# Patient Record
Sex: MALE | Race: WHITE | NOT HISPANIC OR LATINO | Employment: STUDENT | URBAN - METROPOLITAN AREA
[De-identification: names, ages, dates, MRNs, and addresses within clinical notes are randomized per-mention and may not be internally consistent; named-entity substitution may affect disease eponyms.]

---

## 2023-09-10 PROBLEM — Q90.2 TRANSLOCATION DOWN SYNDROME (HHS-HCC): Status: ACTIVE | Noted: 2023-09-10

## 2023-09-10 PROBLEM — E07.9 THYROID DISORDER: Status: ACTIVE | Noted: 2023-09-10

## 2023-09-10 PROBLEM — L30.9 ECZEMA: Status: ACTIVE | Noted: 2023-09-10

## 2023-09-10 PROBLEM — N18.2 STAGE 2 CHRONIC KIDNEY DISEASE: Status: ACTIVE | Noted: 2023-09-10

## 2023-09-10 PROBLEM — E06.3 HASHIMOTO'S THYROIDITIS: Status: ACTIVE | Noted: 2023-09-10

## 2023-09-10 PROBLEM — N13.30 BILATERAL HYDRONEPHROSIS: Status: ACTIVE | Noted: 2023-09-10

## 2023-09-10 PROBLEM — K90.0 CELIAC DISEASE IN PEDIATRIC PATIENT (HHS-HCC): Status: ACTIVE | Noted: 2023-09-10

## 2023-09-10 RX ORDER — LEVOTHYROXINE SODIUM 112 UG/1
0.5 TABLET ORAL DAILY
COMMUNITY
Start: 2018-03-05 | End: 2024-03-25 | Stop reason: SDUPTHER

## 2023-10-12 NOTE — PROGRESS NOTES
I had the pleasure of seeing Trevor Rivera, 23 y.o., male in the [facility] at [facility] for follow up of CKD II based on trending creatinines. His first creatinine was noted to be elevated in January 2021. Repeat creatinine in January 2022 and in April 2022, again, demonstrated an elevated serum creatinine in the range of CKD II. Renal ultrasound in January 2022 did show mild bilateral hydronephrosis and smaller sized kidneys.      Trevor was last seen in October 2022.   Since that time, he has been doing well. Trevor is nonverbal but has been putting his hand over his stomach. Dad is unsure if he has some stomach pain, is hungry or full. He may have some issues with constipation. Dad saw blood in stool one time, but never again. Trevor has been placing his hand over his stomach for probably the last year or so. Dad reports that he stools usually once a day, sometimes every two days, but he is at school most of the day, so he is unsure exactly. Dad denies any recent illnesses, fevers, concerns for UTIs, gross hematuria. No swelling in his feet or ankles.     Birth History:     Born full term, no NICU stay, mom reports that he had some heart testing after he was born, which was normal     Review of Systems   Gastrointestinal:  Positive for abdominal pain.   All other systems reviewed and are negative.    Current Outpatient Medications   Medication Instructions    levothyroxine (Synthroid, Levoxyl) 112 mcg tablet 0.5 tablets, oral, Daily      Patient Active Problem List:  Patient Active Problem List    Diagnosis Date Noted    Bilateral hydronephrosis 09/10/2023    Celiac disease in pediatric patient 09/10/2023    Eczema 09/10/2023    Hashimoto's thyroiditis 09/10/2023    Stage 2 chronic kidney disease 09/10/2023    Thyroid disorder 09/10/2023    Translocation Down syndrome 09/10/2023       Past Medical History:     Past Medical History:   Diagnosis Date    Acute upper  respiratory infection, unspecified 07/17/2017    Acute upper respiratory infection    Enteroviral vesicular stomatitis with exanthem 12/13/2017    Hand, foot and mouth disease    Hypothyroidism, unspecified 06/24/2013    Hypothyroidism    Otitis media, unspecified, right ear 07/17/2017    Acute right otitis media    Personal history of other diseases of the nervous system and sense organs 04/02/2015    History of acute otitis media    Personal history of pneumonia (recurrent) 07/17/2017    History of pneumonia    Rash and other nonspecific skin eruption 04/03/2015    Rash       Past Surgical History:     No past surgical history on file.    Family History:     Family History   Problem Relation Name Age of Onset    Diabetes Paternal Grandmother      Down syndrome Other      Celiac disease Other       Social History:     Trevor is in school. He likes to vacuum and wash/organize the dishes in the kitchen     Visit Vitals  /74   Pulse 88   Temp 36.7 °C (98 °F)     Facility age limit for growth %hayley is 20 years.     Physical Exam  Vitals reviewed.   Constitutional:       Appearance: Normal appearance.   HENT:      Head: Normocephalic and atraumatic.      Nose: Nose normal.      Mouth/Throat:      Mouth: Mucous membranes are moist.   Cardiovascular:      Rate and Rhythm: Normal rate and regular rhythm.      Pulses: Normal pulses.      Heart sounds: Normal heart sounds. No murmur heard.  Pulmonary:      Effort: Pulmonary effort is normal.      Breath sounds: Normal breath sounds.   Abdominal:      Palpations: Abdomen is soft.      Tenderness: There is no right CVA tenderness or left CVA tenderness.   Musculoskeletal:         General: No swelling. Normal range of motion.      Cervical back: Normal range of motion and neck supple.   Skin:     General: Skin is warm and dry.   Neurological:      General: No focal deficit present.      Mental Status: He is alert. Mental status is at baseline.   Psychiatric:         Mood  and Affect: Mood normal.         Behavior: Behavior normal.        Labs:             Component  Ref Range & Units 12 mo ago  (10/14/22) 1 yr ago  (4/8/22) 1 yr ago  (1/18/22) 1 yr ago  (12/9/21) 2 yr ago  (1/5/21)   Glucose  74 - 99 mg/dL 83 96 84 89 101 High    Sodium  136 - 145 mmol/L 138 140 139 138 139   Potassium  3.5 - 5.3 mmol/L 3.8 4.5 4.2 4.4 4.2   Chloride  98 - 107 mmol/L 100 101 102 101 102   Bicarbonate  21 - 32 mmol/L 31 31 30 29 26   Anion Gap  10 - 20 mmol/L 11 13 11 12 15   Urea Nitrogen  6 - 23 mg/dL 18 22 15 20 19   Creatinine  0.50 - 1.30 mg/dL 1.21 1.22 1.23 1.57 High  1.33 High    GFR MALE  >90 mL/min/1.73m2 88 87 CM 87 CM         Radiology:  Narrative & Impression   MRN: [id number]  Patient Name: ERIN PARK     STUDY:  US RENAL BILAT;  1/18/2022 5:57 pm     INDICATION:  Elevated creatinine, concern for urinary holding, renal dysplasia  R79.89: Elevated serum creatinine.     COMPARISON:  None.    ORDERING CLINICIAN:  ERASTO OKEEFE     TECHNIQUE:  Real-time sonographic evaluation of the kidneys and urinary bladder  was performed.     FINDINGS:  RIGHT KIDNEY:  Right kidney measures  9.8 cm.  Mild right hydronephrosis is present.  No shadowing calculus is visualized. No discrete renal lesion is  visualized.     LEFT KIDNEY:  Left kidney measures  9.6 cm.  Mild left hydronephrosis is present.  No shadowing calculus identified. No discrete renal lesion is  visualized.     BLADDER:  Distended bladder has a calculated volume of  627 mL.     No intraluminal mass, wall thickening or shadowing calculus is  visualized. Both ureteral jets can be demonstrated with color Doppler  imaging     Small volume postvoid residual of 9 mL is seen.     IMPRESSION:  Bilateral mild hydronephrosis. No shadowing calculi demonstrated.     No focal urinary bladder abnormality identified.  Small volume  postvoid residual.       Assessment:  In summary, Erin is a 23 y.o. male with Trisomy 21 and Hashimoto's thyroiditis  who presents today for a follow up of CKD II most likely due to the mild hydronephrosis and renal dysplasia, which may have been caused by his urinary holding. Records from November 2010 demonstrate a creatinine of 0.7mg/dL giving him an eGFR of 65mL/min/1.73m2 using the CKiD U25 formula when he was about 10 years old. His last creatinine checked in October 2022 was 1.21mg/dL giving him an eGFR of 65mL/min/1.73m2 using the CKiD U25 w/cystatin c formula (stable CKD II). For his age, his stature is at the 1st% but when he is plotted on the Down syndrome growth chart, his height puts him at the ~90th% for his age. Based on his past blood work, he most likely has had CKD since age 10. His urine today is negative for blood and protein. It is reassuring that Trevor remains normotensive, and without hematuria or proteinuria.     Recommendations:    1. Ordered full CKD labs, CBC, RFP, cystatin c, Ferritin, Iron/TIBC, PTH, uric acid, vitamin D  2. Follow up with me in 1 year, if CKD labs are concerning, may see back in 6 months   3. Discussed with father, if abdominal pain continues or gets worse, see PCP/pediatrician. Consider abdominal xray for constipation versus abdominal ultrasound to assess gallbladder and kidneys     RAYMOND Aden, CNP  Pediatric Nephrology and Hypertension   [id number] [place] [address]  (P) [phone]  (F) [phone]

## 2023-10-13 ENCOUNTER — LAB (OUTPATIENT)
Dept: LAB | Facility: LAB | Age: 23
End: 2023-10-13
Payer: COMMERCIAL

## 2023-10-13 ENCOUNTER — OFFICE VISIT (OUTPATIENT)
Dept: PEDIATRIC NEPHROLOGY | Facility: CLINIC | Age: 23
End: 2023-10-13
Payer: COMMERCIAL

## 2023-10-13 VITALS
TEMPERATURE: 98 F | HEART RATE: 88 BPM | BODY MASS INDEX: 21.88 KG/M2 | WEIGHT: 123.46 LBS | SYSTOLIC BLOOD PRESSURE: 115 MMHG | HEIGHT: 63 IN | DIASTOLIC BLOOD PRESSURE: 74 MMHG

## 2023-10-13 DIAGNOSIS — N13.30 BILATERAL HYDRONEPHROSIS: ICD-10-CM

## 2023-10-13 DIAGNOSIS — N18.2 STAGE 2 CHRONIC KIDNEY DISEASE: Primary | ICD-10-CM

## 2023-10-13 DIAGNOSIS — N18.2 STAGE 2 CHRONIC KIDNEY DISEASE: ICD-10-CM

## 2023-10-13 LAB
25(OH)D3 SERPL-MCNC: 29 NG/ML (ref 30–100)
ALBUMIN SERPL BCP-MCNC: 4.4 G/DL (ref 3.4–5)
ANION GAP SERPL CALC-SCNC: 10 MMOL/L (ref 10–20)
BASOPHILS # BLD AUTO: 0.1 X10*3/UL (ref 0–0.1)
BASOPHILS NFR BLD AUTO: 1.2 %
BUN SERPL-MCNC: 13 MG/DL (ref 6–23)
CALCIUM SERPL-MCNC: 9.2 MG/DL (ref 8.6–10.3)
CHLORIDE SERPL-SCNC: 101 MMOL/L (ref 98–107)
CO2 SERPL-SCNC: 32 MMOL/L (ref 21–32)
CREAT SERPL-MCNC: 1.36 MG/DL (ref 0.5–1.3)
EOSINOPHIL # BLD AUTO: 0.06 X10*3/UL (ref 0–0.7)
EOSINOPHIL NFR BLD AUTO: 0.7 %
ERYTHROCYTE [DISTWIDTH] IN BLOOD BY AUTOMATED COUNT: 12.6 % (ref 11.5–14.5)
FERRITIN SERPL-MCNC: 164 NG/ML (ref 20–300)
GFR SERPL CREATININE-BSD FRML MDRD: 76 ML/MIN/1.73M*2
GLUCOSE SERPL-MCNC: 105 MG/DL (ref 74–99)
HCT VFR BLD AUTO: 47.3 % (ref 41–52)
HGB BLD-MCNC: 17 G/DL (ref 13.5–17.5)
IMM GRANULOCYTES # BLD AUTO: 0.01 X10*3/UL (ref 0–0.7)
IMM GRANULOCYTES NFR BLD AUTO: 0.1 % (ref 0–0.9)
IRON SATN MFR SERPL: 23 % (ref 25–45)
IRON SERPL-MCNC: 74 UG/DL (ref 35–150)
LYMPHOCYTES # BLD AUTO: 2.58 X10*3/UL (ref 1.2–4.8)
LYMPHOCYTES NFR BLD AUTO: 31 %
MCH RBC QN AUTO: 34.1 PG (ref 26–34)
MCHC RBC AUTO-ENTMCNC: 35.9 G/DL (ref 32–36)
MCV RBC AUTO: 95 FL (ref 80–100)
MONOCYTES # BLD AUTO: 0.43 X10*3/UL (ref 0.1–1)
MONOCYTES NFR BLD AUTO: 5.2 %
NEUTROPHILS # BLD AUTO: 5.14 X10*3/UL (ref 1.2–7.7)
NEUTROPHILS NFR BLD AUTO: 61.8 %
NRBC BLD-RTO: 0 /100 WBCS (ref 0–0)
PHOSPHATE SERPL-MCNC: 4.3 MG/DL (ref 2.5–4.9)
PLATELET # BLD AUTO: 236 X10*3/UL (ref 150–450)
PMV BLD AUTO: 10.1 FL (ref 7.5–11.5)
POC APPEARANCE, URINE: CLEAR
POC BILIRUBIN, URINE: NEGATIVE
POC BLOOD, URINE: NEGATIVE
POC COLOR, URINE: YELLOW
POC GLUCOSE, URINE: NEGATIVE MG/DL
POC KETONES, URINE: NEGATIVE MG/DL
POC LEUKOCYTES, URINE: NEGATIVE
POC NITRITE,URINE: NEGATIVE
POC PH, URINE: 7 PH
POC PROTEIN, URINE: NEGATIVE MG/DL
POC SPECIFIC GRAVITY, URINE: <=1.005
POC UROBILINOGEN, URINE: 0.2 EU/DL
POTASSIUM SERPL-SCNC: 4.1 MMOL/L (ref 3.5–5.3)
PTH-INTACT SERPL-MCNC: 27.3 PG/ML (ref 18.5–88)
RBC # BLD AUTO: 4.99 X10*6/UL (ref 4.5–5.9)
SODIUM SERPL-SCNC: 139 MMOL/L (ref 136–145)
TIBC SERPL-MCNC: 315 UG/DL (ref 240–445)
UIBC SERPL-MCNC: 241 UG/DL (ref 110–370)
URATE SERPL-MCNC: 6.6 MG/DL (ref 4–7.5)
WBC # BLD AUTO: 8.3 X10*3/UL (ref 4.4–11.3)

## 2023-10-13 PROCEDURE — 81002 URINALYSIS NONAUTO W/O SCOPE: CPT | Performed by: NURSE PRACTITIONER

## 2023-10-13 PROCEDURE — 85025 COMPLETE CBC W/AUTO DIFF WBC: CPT

## 2023-10-13 PROCEDURE — 83550 IRON BINDING TEST: CPT

## 2023-10-13 PROCEDURE — 80069 RENAL FUNCTION PANEL: CPT

## 2023-10-13 PROCEDURE — 82610 CYSTATIN C: CPT

## 2023-10-13 PROCEDURE — 99214 OFFICE O/P EST MOD 30 MIN: CPT | Performed by: NURSE PRACTITIONER

## 2023-10-13 PROCEDURE — 82728 ASSAY OF FERRITIN: CPT

## 2023-10-13 PROCEDURE — 36415 COLL VENOUS BLD VENIPUNCTURE: CPT

## 2023-10-13 PROCEDURE — 83970 ASSAY OF PARATHORMONE: CPT

## 2023-10-13 PROCEDURE — 84550 ASSAY OF BLOOD/URIC ACID: CPT

## 2023-10-13 PROCEDURE — 83540 ASSAY OF IRON: CPT

## 2023-10-13 PROCEDURE — 82306 VITAMIN D 25 HYDROXY: CPT

## 2023-10-13 ASSESSMENT — ENCOUNTER SYMPTOMS: ABDOMINAL PAIN: 1

## 2023-10-13 NOTE — PATIENT INSTRUCTIONS
Trevor Rivera, thank you for seeing me today. Please feel free to call my office with any questions or concerns.   Here is our plan:    Please gt blood work to check kidney function. I will call you with the results next week.   Plan to follow up in 1 year unless blood work looks concerning, then I may see him back sooner  Blood pressure and urine are normal today     RAYMOND Aden, CNP  Pediatric Nephrology and Hypertension   [address] [address] [place] [address]  (P) [phone]  (F) [phone]

## 2023-10-15 LAB
CYSTATIN C SERPL-MCNC: 1.3 MG/L (ref 0.5–1.2)
GFR/BSA.PRED SERPLBLD CYS-BASED-ARV: 64 ML/MIN/BSA

## 2023-10-16 DIAGNOSIS — E55.9 HYPOVITAMINOSIS D: Primary | ICD-10-CM

## 2023-10-16 RX ORDER — CHOLECALCIFEROL (VITAMIN D3) 25 MCG
25 TABLET ORAL DAILY
Qty: 30 TABLET | Refills: 11 | Status: SHIPPED | OUTPATIENT
Start: 2023-10-16 | End: 2023-11-08

## 2023-11-08 DIAGNOSIS — E55.9 HYPOVITAMINOSIS D: ICD-10-CM

## 2023-11-08 RX ORDER — CHOLECALCIFEROL (VITAMIN D3) 25 MCG
TABLET ORAL DAILY
Qty: 90 TABLET | Refills: 4 | Status: SHIPPED | OUTPATIENT
Start: 2023-11-08

## 2024-03-25 DIAGNOSIS — E06.3 HASHIMOTO'S THYROIDITIS: ICD-10-CM

## 2024-03-25 RX ORDER — LEVOTHYROXINE SODIUM 112 UG/1
56 TABLET ORAL DAILY
Qty: 15 TABLET | Refills: 0 | Status: SHIPPED | OUTPATIENT
Start: 2024-03-25 | End: 2024-04-29 | Stop reason: SDUPTHER

## 2024-04-29 DIAGNOSIS — E06.3 HASHIMOTO'S THYROIDITIS: ICD-10-CM

## 2024-04-29 RX ORDER — LEVOTHYROXINE SODIUM 112 UG/1
56 TABLET ORAL DAILY
Qty: 15 TABLET | Refills: 2 | Status: SHIPPED | OUTPATIENT
Start: 2024-04-29

## 2024-07-25 ENCOUNTER — APPOINTMENT (OUTPATIENT)
Dept: PEDIATRIC ENDOCRINOLOGY | Facility: CLINIC | Age: 24
End: 2024-07-25
Payer: COMMERCIAL

## 2024-07-25 ENCOUNTER — LAB (OUTPATIENT)
Dept: LAB | Facility: LAB | Age: 24
End: 2024-07-25
Payer: COMMERCIAL

## 2024-07-25 VITALS
DIASTOLIC BLOOD PRESSURE: 75 MMHG | HEIGHT: 63 IN | BODY MASS INDEX: 21.8 KG/M2 | WEIGHT: 123.02 LBS | HEART RATE: 69 BPM | SYSTOLIC BLOOD PRESSURE: 126 MMHG | TEMPERATURE: 98.3 F

## 2024-07-25 DIAGNOSIS — E06.3 HASHIMOTO'S THYROIDITIS: ICD-10-CM

## 2024-07-25 DIAGNOSIS — Q90.2 TRANSLOCATION DOWN SYNDROME (HHS-HCC): Primary | ICD-10-CM

## 2024-07-25 DIAGNOSIS — E07.9 THYROID DISORDER: ICD-10-CM

## 2024-07-25 DIAGNOSIS — Q90.2 TRANSLOCATION DOWN SYNDROME (HHS-HCC): ICD-10-CM

## 2024-07-25 LAB
T4 FREE SERPL-MCNC: 0.86 NG/DL (ref 0.61–1.12)
TSH SERPL-ACNC: 1.11 MIU/L (ref 0.44–3.98)
TTG IGA SER IA-ACNC: <1 U/ML

## 2024-07-25 PROCEDURE — 3008F BODY MASS INDEX DOCD: CPT | Performed by: PEDIATRICS

## 2024-07-25 PROCEDURE — 36415 COLL VENOUS BLD VENIPUNCTURE: CPT

## 2024-07-25 PROCEDURE — 84443 ASSAY THYROID STIM HORMONE: CPT

## 2024-07-25 PROCEDURE — 84439 ASSAY OF FREE THYROXINE: CPT

## 2024-07-25 PROCEDURE — 99214 OFFICE O/P EST MOD 30 MIN: CPT | Performed by: PEDIATRICS

## 2024-07-25 PROCEDURE — 83516 IMMUNOASSAY NONANTIBODY: CPT

## 2024-07-25 RX ORDER — LEVOTHYROXINE SODIUM 112 UG/1
56 TABLET ORAL DAILY
Qty: 15 TABLET | Refills: 11 | Status: SHIPPED | OUTPATIENT
Start: 2024-07-25

## 2024-07-25 NOTE — PROGRESS NOTES
"Subjective   Trevor Rivera is a 23 y.o. 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 Patient was diagnosed 20 years ago.  Medications : Levothyroxine 56 mcg (1/2 of a 112 mcg tab)  Adherence : never misses a dose  Patient takes Medication : 30+ minutes before breakfast  Hyperthyroid Symptoms : none  Hypothyroid Symptoms : none    Finished school at [employer], will be with [employer] [facility].     Starts next week.       Current Outpatient Medications:   ·  cholecalciferol (Vitamin D-3) 25 MCG (1000 UT) tablet, TAKE 1 TABLET (25 MCG) BY MOUTH DAILY, Disp: 90 tablet, Rfl: 4  ·  levothyroxine (Synthroid, Levoxyl) 112 mcg tablet, Take 0.5 tablets (56 mcg) by mouth once daily., Disp: 15 tablet, Rfl: 2    Has a gluten free diet and doing well with this.       Review of Systems   Constitutional: Negative.    HENT: Negative.     Eyes: Negative.    Respiratory: Negative.     Cardiovascular: Negative.    Gastrointestinal: Negative.    Musculoskeletal: Negative.    Neurological: Negative.    Hematological: Negative.    Psychiatric/Behavioral: Negative.         Objective   /75   Pulse 69   Temp 36.8 °C (98.3 °F)   Ht 1.59 m (5' 2.6\")   Wt 55.8 kg (123 lb 0.3 oz)   BMI 22.07 kg/m²    Growth Velocity: Facility age limit for growth %hayley is 20 years.    Physical Exam  Constitutional:       Appearance: Normal appearance.   HENT:      Head: Normocephalic and atraumatic.      Nose: Nose normal.      Mouth/Throat:      Mouth: Mucous membranes are moist.   Eyes:      Extraocular Movements: Extraocular movements intact.      Conjunctiva/sclera: Conjunctivae normal.   Neck:      Comments: No nodules or lymphadenopathy identified.  Cardiovascular:      Rate and Rhythm: Normal rate and regular rhythm.      Heart sounds: Normal heart sounds.   Pulmonary:      Effort: Pulmonary effort is normal.      Breath sounds: Normal breath sounds.   Abdominal:      General: Bowel " sounds are normal.      Palpations: Abdomen is soft.   Musculoskeletal:         General: Normal range of motion.      Cervical back: Normal range of motion and neck supple.   Skin:     General: Skin is warm and dry.   Neurological:      General: No focal deficit present.      Mental Status: He is alert and oriented to person, place, and time.   Psychiatric:         Mood and Affect: Mood normal.         Behavior: Behavior normal.          Assessment/Plan   Problem List Items Addressed This Visit             ICD-10-CM    Hashimoto's thyroiditis E06.3    Relevant Medications    levothyroxine (Synthroid, Levoxyl) 112 mcg tablet    Other Relevant Orders    Tissue Transglutaminase IgA (Completed)    Thyroid Stimulating Hormone (Completed)    Thyroxine, Free (Completed)    Thyroid disorder E07.9    Relevant Orders    Tissue Transglutaminase IgA (Completed)    Thyroid Stimulating Hormone (Completed)    Thyroxine, Free (Completed)    Translocation Down syndrome ([facility]) - Primary Q90.2    Relevant Orders    Tissue Transglutaminase IgA (Completed)    Thyroid Stimulating Hormone (Completed)    Thyroxine, Free (Completed)     Trisomy 21 with autoimmune hypothyroidism and celiac disease. Clinically euthyroid on 56 mcg levothyroxine daily. Check labs today including TTG antibodies. Refill medication and follow up annually.

## 2024-07-31 ASSESSMENT — ENCOUNTER SYMPTOMS
HEMATOLOGIC/LYMPHATIC NEGATIVE: 1
CARDIOVASCULAR NEGATIVE: 1
EYES NEGATIVE: 1
GASTROINTESTINAL NEGATIVE: 1
CONSTITUTIONAL NEGATIVE: 1
RESPIRATORY NEGATIVE: 1
NEUROLOGICAL NEGATIVE: 1
PSYCHIATRIC NEGATIVE: 1
MUSCULOSKELETAL NEGATIVE: 1

## 2024-10-11 ENCOUNTER — APPOINTMENT (OUTPATIENT)
Dept: PEDIATRIC NEPHROLOGY | Facility: CLINIC | Age: 24
End: 2024-10-11
Payer: COMMERCIAL

## 2024-10-11 VITALS
WEIGHT: 118.61 LBS | DIASTOLIC BLOOD PRESSURE: 81 MMHG | TEMPERATURE: 97.8 F | BODY MASS INDEX: 21.02 KG/M2 | HEIGHT: 63 IN | HEART RATE: 76 BPM | SYSTOLIC BLOOD PRESSURE: 128 MMHG | OXYGEN SATURATION: 95 %

## 2024-10-11 DIAGNOSIS — N18.2 STAGE 2 CHRONIC KIDNEY DISEASE: Primary | ICD-10-CM

## 2024-10-11 DIAGNOSIS — Q90.2 TRANSLOCATION DOWN SYNDROME (HHS-HCC): ICD-10-CM

## 2024-10-11 DIAGNOSIS — N13.30 BILATERAL HYDRONEPHROSIS: ICD-10-CM

## 2024-10-11 LAB
CREAT UR-MCNC: 46.6 MG/DL (ref 20–370)
MICROALBUMIN UR-MCNC: <7 MG/L
MICROALBUMIN/CREAT UR: NORMAL MG/G{CREAT}
POC APPEARANCE, URINE: CLEAR
POC BILIRUBIN, URINE: NEGATIVE
POC BLOOD, URINE: NEGATIVE
POC COLOR, URINE: YELLOW
POC GLUCOSE, URINE: NEGATIVE MG/DL
POC KETONES, URINE: NEGATIVE MG/DL
POC LEUKOCYTES, URINE: NEGATIVE
POC NITRITE,URINE: NEGATIVE
POC PH, URINE: 7 PH
POC PROTEIN, URINE: NEGATIVE MG/DL
POC SPECIFIC GRAVITY, URINE: 1.01
POC UROBILINOGEN, URINE: 0.2 EU/DL

## 2024-10-11 ASSESSMENT — ENCOUNTER SYMPTOMS: ABDOMINAL PAIN: 1

## 2024-10-11 NOTE — PATIENT INSTRUCTIONS
Trevor Rivera, thank you for seeing me today. Please feel free to call my office with any questions or concerns.   Here is our plan:    Please get the kidney ultrasound to check his kidneys   Please get the blood work to trend his kidney function   Plan to follow up in 1 year unless blood work looks concerning may see him back in 6 months    RAYMOND Aden, CNP  Pediatric Nephrology and Hypertension   [address] [id number] [place] [address]  (P) [phone]  (F) [phone]    Radiology [facility]- [phone]

## 2024-10-11 NOTE — PROGRESS NOTES
I had the pleasure of seeing Trevor Rivera, 24 y.o., male in the [facility] at [facility] for follow up of CKD II based on trending creatinines. Trevor has a medical history that is significant for Trisomy 21, hypothyroidism, history of febrile UTI at 10 months of age, normal VCUG and CKD. His first creatinine was noted to be elevated in January 2021. Repeat creatinine in January 2022 and in April 2022, again, demonstrated an elevated serum creatinine in the range of CKD II. Renal ultrasound in January 2022 did show mild bilateral hydronephrosis and smaller sized kidneys. He is here today with his father.     Trevor was last seen in October 2023, since that time, he has been doing well. Dad does not have any concerns at this time. Trevor is not on vitamin D supplementation, he only takes his levothyroxine. After speaking with dad, Trevor did have a febrile UTI around the age of 3, he was hospitalized for a high fever  ,l had UTI at age 3,  was hopaiatiszed with fever,some abx didn't work   VCUD in Augst 2003 was nrmnal no giaase hjautia,     Trevor is nonverbal but has been putting his hand over his stomach. Dad is unsure if he has some stomach pain, is hungry or full. He may have some issues with constipation. Dad saw blood in stool one time, but never again. Trevor has been placing his hand over his stomach for probably the last year or so. Dad reports that he stools usually once a day, sometimes every two days, but he is at school most of the day, so he is unsure exactly. Dad denies any recent illnesses, fevers, concerns for UTIs, gross hematuria. No swelling in his feet or ankles.     Birth History:     Born full term 37 weeks IUGR, no NICU stay, mom reports that he had some heart testing after he was born, which was normal     Review of Systems   Gastrointestinal:  Positive for abdominal pain.   All other systems reviewed and are negative.    Current Outpatient Medications    Medication Instructions    cholecalciferol (Vitamin D-3) 25 MCG (1000 UT) tablet oral, Daily    levothyroxine (SYNTHROID, LEVOXYL) 56 mcg, oral, Daily      Patient Active Problem List:  Patient Active Problem List    Diagnosis Date Noted    Bilateral hydronephrosis 09/10/2023    Celiac disease in pediatric patient ([facility]) 09/10/2023    Eczema 09/10/2023    Hashimoto's thyroiditis 09/10/2023    Stage 2 chronic kidney disease 09/10/2023    Thyroid disorder 09/10/2023    Translocation Down syndrome ([facility]) 09/10/2023     Past Medical History:     Past Medical History:   Diagnosis Date    Acute upper respiratory infection, unspecified 07/17/2017    Acute upper respiratory infection    Enteroviral vesicular stomatitis with exanthem 12/13/2017    Hand, foot and mouth disease    Hypothyroidism, unspecified 06/24/2013    Hypothyroidism    Otitis media, unspecified, right ear 07/17/2017    Acute right otitis media    Personal history of other diseases of the nervous system and sense organs 04/02/2015    History of acute otitis media    Personal history of pneumonia (recurrent) 07/17/2017    History of pneumonia    Rash and other nonspecific skin eruption 04/03/2015    Rash     Past Surgical History:     No past surgical history on file.    Family History:     Family History   Problem Relation Name Age of Onset    Diabetes Paternal Grandmother      Down syndrome Other      Celiac disease Other     Nomore greg alejandra, deonteof eater,   Social History:     Trevor is in school. He likes to vacuum and wash/organize the dishes in the kitchen     Visit Vitals  /81   Pulse 76   Temp 36.6 °C (97.8 °F)       Facility age limit for growth %hayley is 20 years.     Physical Exam  Vitals reviewed.   Constitutional:       Appearance: Normal appearance.   HENT:      Head: Normocephalic and atraumatic.      Nose: Nose normal.      Mouth/Throat:      Mouth: Mucous membranes are moist.   Cardiovascular:      Rate and Rhythm: Normal  rate and regular rhythm.      Pulses: Normal pulses.      Heart sounds: Normal heart sounds. No murmur heard.  Pulmonary:      Effort: Pulmonary effort is normal.      Breath sounds: Normal breath sounds.   Abdominal:      Palpations: Abdomen is soft.      Tenderness: There is no right CVA tenderness or left CVA tenderness.   Musculoskeletal:         General: No swelling. Normal range of motion.      Cervical back: Normal range of motion and neck supple.   Skin:     General: Skin is warm and dry.   Neurological:      General: No focal deficit present.      Mental Status: He is alert. Mental status is at baseline.   Psychiatric:         Mood and Affect: Mood normal.         Behavior: Behavior normal.      Labs:  Component      Latest Ref Rng 4/8/2022 10/14/2022 10/13/2023   GLUCOSE      74 - 99 mg/dL 96  83  105 (H)    SODIUM      136 - 145 mmol/L 140  138  139    POTASSIUM      3.5 - 5.3 mmol/L 4.5  3.8  4.1    CHLORIDE      98 - 107 mmol/L 101  100  101    Bicarbonate      21 - 32 mmol/L 31  31  32    Anion Gap      10 - 20 mmol/L 13  11  10    Blood Urea Nitrogen      6 - 23 mg/dL 22  18  13    Creatinine      0.50 - 1.30 mg/dL 1.22  1.21  1.36 (H)    Calcium      8.6 - 10.3 mg/dL 9.5  9.3  9.2    Albumin      3.4 - 5.0 g/dL 4.4  4.4  4.4    GFR MALE      >90 mL/min/1.73m2 87  88     PHOSPHORUS      2.5 - 4.9 mg/dL 4.1  4.4  4.3       Component      Latest Ref Rng 10/11/2024   Albumin/Creatinine Ratio --    Creatinine, Urine Random      20.0 - 370.0 mg/dL 46.6    Albumin, Urine Random      Not established mg/L <7.0        Radiology:  [facility] at [facility]- 08/11/2003  Associated Order(s): VOIDING CYSTO URETHROGRAM  A  film of the abdomen demonstrates a nonspecific gas pattern  without abnormal calcifications or osseous abnormalities.    Using sterile technique the urinary bladder was catheterized and  filled with approximately 40cc's of 30% Conray under fluoroscopic  control. The patient then spontaneously voided.  "Spot films were  obtained during filling and voiding.    The bladder is normal in size and contour. No vesicoureteral  reflux is demonstrated. The urethra is normal in size and  configuration. No significant postvoid residual is present.    IMPRESSION:  Normal voiding cystourethrogram.    US RENAL BILAT;  1/18/2022  Elevated creatinine, concern for urinary holding, renal dysplasia  R79.89: Elevated serum creatinine.    ORDERING CLINICIAN:  ERASTO OKEEFE    FINDINGS:  RIGHT KIDNEY:  Right kidney measures  9.8 cm.  Mild right hydronephrosis is present.  No shadowing calculus is visualized. No discrete renal lesion is  visualized.     LEFT KIDNEY:  Left kidney measures  9.6 cm.  Mild left hydronephrosis is present.  No shadowing calculus identified. No discrete renal lesion is  visualized.     BLADDER:  Distended bladder has a calculated volume of  627 mL.     No intraluminal mass, wall thickening or shadowing calculus is  visualized. Both ureteral jets can be demonstrated with color Doppler  imaging     Small volume postvoid residual of 9 mL is seen.     IMPRESSION:  Bilateral mild hydronephrosis. No shadowing calculi demonstrated.  No focal urinary bladder abnormality identified.  Small volume  postvoid residual.    Assessment:  In summary, Trevor is a 24 y.o. male with Trisomy 21 and Hashimoto's thyroiditis who presents today for a follow up of CKD II most likely due to the mild hydronephrosis and renal dysplasia, which may have been caused by his urinary holding or history of febrile UTI. Renal ultrasound in January 2022 showed a right kidney that measured 9.8cm with mild hydronephrosis (~6mm dilation) and a left kidney that measured 9.6cm with mild hydronephrosis. [facility] show that he had a febrile UTI at age 10 months, \"urine culture and sensitivity showed greater than 10,000 colony-forming units/ml of E. coli which was pansensitive to all antibiotics tested\", was on prophylactic antibiotics, until VCUG " was done, which was negative. Records from November 2010 demonstrate a creatinine of 0.7mg/dL giving him an eGFR of 65mL/min/1.73m2 using the CKiD U25 formula when he was about 10 years old. His last creatinine checked in October 2023 was 1.36mg/dL giving him an eGFR of 59mL/min/1.73m2 using the pediatric CKiD U25 w/cystatin c formula (stable CKD IIIa) to 68mL/min/1.73m2 using the adult CKD-EPI creatinine-cystatin c formula (CKD II). For his age, his stature is at the 1st% but when he is plotted on the Down syndrome growth chart, his height puts him at the ~90th% for his age. Based on his past blood work, he most likely has had CKD since age 10. His urine today is negative for blood and protein. It is reassuring that Trevor remains normotensive, and without hematuria or proteinuria.     Recommendations:    Ordered full CKD labs, CBC, RFP, cystatin c, Ferritin, Iron/TIBC, PTH, uric acid, vitamin D  Sent midday urine sample for albumin to creatinine ratio- normal  Ordered repeat renal ultrasound to reassess bilateral hydronephrosis  Follow up with me in 1 year, if CKD labs are concerning, may see back in 6 months, will discuss    RAYMOND Aden, CNP  Pediatric Nephrology and Hypertension   [id number] [place] [address]  (P) [phone]  (F) [phone]

## 2024-10-16 ENCOUNTER — HOSPITAL ENCOUNTER (OUTPATIENT)
Dept: RADIOLOGY | Facility: HOSPITAL | Age: 24
Discharge: HOME | End: 2024-10-16
Payer: COMMERCIAL

## 2024-10-16 ENCOUNTER — LAB (OUTPATIENT)
Dept: LAB | Facility: LAB | Age: 24
End: 2024-10-16
Payer: COMMERCIAL

## 2024-10-16 DIAGNOSIS — N13.30 BILATERAL HYDRONEPHROSIS: ICD-10-CM

## 2024-10-16 DIAGNOSIS — Q90.2 TRANSLOCATION DOWN SYNDROME (HHS-HCC): ICD-10-CM

## 2024-10-16 DIAGNOSIS — N18.2 STAGE 2 CHRONIC KIDNEY DISEASE: ICD-10-CM

## 2024-10-16 LAB
25(OH)D3 SERPL-MCNC: 47 NG/ML (ref 30–100)
ALBUMIN SERPL BCP-MCNC: 4.5 G/DL (ref 3.4–5)
ANION GAP SERPL CALC-SCNC: 13 MMOL/L (ref 10–20)
BASOPHILS # BLD AUTO: 0.1 X10*3/UL (ref 0–0.1)
BASOPHILS NFR BLD AUTO: 1.5 %
BUN SERPL-MCNC: 19 MG/DL (ref 6–23)
CALCIUM SERPL-MCNC: 9.5 MG/DL (ref 8.6–10.3)
CHLORIDE SERPL-SCNC: 99 MMOL/L (ref 98–107)
CO2 SERPL-SCNC: 31 MMOL/L (ref 21–32)
CREAT SERPL-MCNC: 1.24 MG/DL (ref 0.5–1.3)
EGFRCR SERPLBLD CKD-EPI 2021: 84 ML/MIN/1.73M*2
EOSINOPHIL # BLD AUTO: 0.05 X10*3/UL (ref 0–0.7)
EOSINOPHIL NFR BLD AUTO: 0.7 %
ERYTHROCYTE [DISTWIDTH] IN BLOOD BY AUTOMATED COUNT: 12.4 % (ref 11.5–14.5)
FERRITIN SERPL-MCNC: 201 NG/ML (ref 20–300)
GLUCOSE SERPL-MCNC: 82 MG/DL (ref 74–99)
HCT VFR BLD AUTO: 49.4 % (ref 41–52)
HGB BLD-MCNC: 17.6 G/DL (ref 13.5–17.5)
IMM GRANULOCYTES # BLD AUTO: 0.02 X10*3/UL (ref 0–0.7)
IMM GRANULOCYTES NFR BLD AUTO: 0.3 % (ref 0–0.9)
IRON SATN MFR SERPL: 29 % (ref 25–45)
IRON SERPL-MCNC: 90 UG/DL (ref 35–150)
LYMPHOCYTES # BLD AUTO: 2.98 X10*3/UL (ref 1.2–4.8)
LYMPHOCYTES NFR BLD AUTO: 44.1 %
MAGNESIUM SERPL-MCNC: 2.15 MG/DL (ref 1.6–2.4)
MCH RBC QN AUTO: 33.8 PG (ref 26–34)
MCHC RBC AUTO-ENTMCNC: 35.6 G/DL (ref 32–36)
MCV RBC AUTO: 95 FL (ref 80–100)
MONOCYTES # BLD AUTO: 0.39 X10*3/UL (ref 0.1–1)
MONOCYTES NFR BLD AUTO: 5.8 %
NEUTROPHILS # BLD AUTO: 3.22 X10*3/UL (ref 1.2–7.7)
NEUTROPHILS NFR BLD AUTO: 47.6 %
NRBC BLD-RTO: 0 /100 WBCS (ref 0–0)
PHOSPHATE SERPL-MCNC: 4.4 MG/DL (ref 2.5–4.9)
PLATELET # BLD AUTO: 219 X10*3/UL (ref 150–450)
POTASSIUM SERPL-SCNC: 4 MMOL/L (ref 3.5–5.3)
PTH-INTACT SERPL-MCNC: 26.4 PG/ML (ref 18.5–88)
RBC # BLD AUTO: 5.21 X10*6/UL (ref 4.5–5.9)
SODIUM SERPL-SCNC: 139 MMOL/L (ref 136–145)
TIBC SERPL-MCNC: 308 UG/DL (ref 240–445)
UIBC SERPL-MCNC: 218 UG/DL (ref 110–370)
URATE SERPL-MCNC: 6.2 MG/DL (ref 4–7.5)
WBC # BLD AUTO: 6.8 X10*3/UL (ref 4.4–11.3)

## 2024-10-16 PROCEDURE — 82728 ASSAY OF FERRITIN: CPT

## 2024-10-16 PROCEDURE — 83970 ASSAY OF PARATHORMONE: CPT

## 2024-10-16 PROCEDURE — 80069 RENAL FUNCTION PANEL: CPT

## 2024-10-16 PROCEDURE — 83540 ASSAY OF IRON: CPT

## 2024-10-16 PROCEDURE — 76770 US EXAM ABDO BACK WALL COMP: CPT

## 2024-10-16 PROCEDURE — 83550 IRON BINDING TEST: CPT

## 2024-10-16 PROCEDURE — 76770 US EXAM ABDO BACK WALL COMP: CPT | Performed by: RADIOLOGY

## 2024-10-16 PROCEDURE — 83735 ASSAY OF MAGNESIUM: CPT

## 2024-10-16 PROCEDURE — 82306 VITAMIN D 25 HYDROXY: CPT

## 2024-10-16 PROCEDURE — 84550 ASSAY OF BLOOD/URIC ACID: CPT

## 2024-10-16 PROCEDURE — 85025 COMPLETE CBC W/AUTO DIFF WBC: CPT

## 2024-10-18 DIAGNOSIS — N32.89 BLADDER DISTENTION: Primary | ICD-10-CM

## 2024-10-18 LAB
CYSTATIN C SERPL-MCNC: 1.2 MG/L (ref 0.5–1.2)
GFR/BSA.PRED SERPLBLD CYS-BASED-ARV: 71 ML/MIN/BSA

## 2024-10-18 NOTE — PROGRESS NOTES
Discussed with dad that Trevor had a significant amount of urine in his bladder (1.2 liters) on the renal ultrasound, with some urine left over in his bladder after he used the restroom, the bilateral mild hydronephrosis remains. This can put Trevor at risk for bladder dysfunction, UTIs, kidney stones and worsening CKD. I placed a referral for adult urology, dad to call and schedule.     His CKD labs are stable, eGFR by cystatin c is 71. Plan to follow up with me in 1 year, unless they need me sooner. All of his questions were answered, he verbalized understanding and is in agreement with the current plan.      RAYMOND Aden, CNP  Pediatric Nephrology and Hypertension   [address] [address] [place] [address]  (P) [phone]  (F) [phone]

## 2024-11-06 ENCOUNTER — APPOINTMENT (OUTPATIENT)
Facility: CLINIC | Age: 24
End: 2024-11-06
Payer: COMMERCIAL

## 2024-11-06 VITALS
HEIGHT: 64 IN | WEIGHT: 121.2 LBS | DIASTOLIC BLOOD PRESSURE: 74 MMHG | SYSTOLIC BLOOD PRESSURE: 115 MMHG | BODY MASS INDEX: 20.69 KG/M2 | HEART RATE: 64 BPM

## 2024-11-06 DIAGNOSIS — N13.30 BILATERAL HYDRONEPHROSIS: Primary | ICD-10-CM

## 2024-11-06 DIAGNOSIS — N32.89 BLADDER DISTENTION: ICD-10-CM

## 2024-11-06 DIAGNOSIS — R39.89 OTHER SYMPTOMS AND SIGNS INVOLVING THE GENITOURINARY SYSTEM: ICD-10-CM

## 2024-11-06 DIAGNOSIS — N47.1 ACQUIRED PHIMOSIS OF PENIS: ICD-10-CM

## 2024-11-06 LAB
POC APPEARANCE, URINE: CLEAR
POC BILIRUBIN, URINE: NEGATIVE
POC BLOOD, URINE: NEGATIVE
POC COLOR, URINE: YELLOW
POC GLUCOSE, URINE: NEGATIVE MG/DL
POC KETONES, URINE: NEGATIVE MG/DL
POC LEUKOCYTES, URINE: NEGATIVE
POC NITRITE,URINE: NEGATIVE
POC PH, URINE: 7.5 PH
POC PROTEIN, URINE: NEGATIVE MG/DL
POC SPECIFIC GRAVITY, URINE: 1.01
POC UROBILINOGEN, URINE: 0.2 EU/DL

## 2024-11-06 PROCEDURE — 1036F TOBACCO NON-USER: CPT | Performed by: STUDENT IN AN ORGANIZED HEALTH CARE EDUCATION/TRAINING PROGRAM

## 2024-11-06 PROCEDURE — 81003 URINALYSIS AUTO W/O SCOPE: CPT | Performed by: STUDENT IN AN ORGANIZED HEALTH CARE EDUCATION/TRAINING PROGRAM

## 2024-11-06 PROCEDURE — 3008F BODY MASS INDEX DOCD: CPT | Performed by: STUDENT IN AN ORGANIZED HEALTH CARE EDUCATION/TRAINING PROGRAM

## 2024-11-06 PROCEDURE — 99203 OFFICE O/P NEW LOW 30 MIN: CPT | Performed by: STUDENT IN AN ORGANIZED HEALTH CARE EDUCATION/TRAINING PROGRAM

## 2024-11-06 NOTE — PROGRESS NOTES
Bladder distention   Chief Complaint    Bladder distention        Referring physician: Michelle Zhang AP*     SUBJECTIVE:  HPI:   Trevor Rivera is a 24 y.o. male with a history of trisomy 21 (translocation), CKD2, Hashimoto thyroiditis, celiac disease who presents with bilateral hydronephrosis.  Here with father who assists with history.  Patient minimally verbally communicative.    Has been following with nephrology for stage 2 CKD (GFR 60-90).  2022 had renal US that showed mild bl hydroureteronephrosis.  Repeat US 10/11/24 showed large distended bladder (1215ml) and stable mild bl hydroureteronephrosis.  Post void volume 89ml.    Dad says has always had ballooning of foreskin with urination but otherwise no urinary complaints.  Patient seems comfortable when voiding.  One prior UTI at 8 months of age, none since.  Did not follow with a pediatric urologist.    PVR 2ml  UA neg nit, neg LE    Past Medical History:   Diagnosis Date    Acute upper respiratory infection, unspecified 07/17/2017    Acute upper respiratory infection    Enteroviral vesicular stomatitis with exanthem 12/13/2017    Hand, foot and mouth disease    Hypothyroidism, unspecified 06/24/2013    Hypothyroidism    Otitis media, unspecified, right ear 07/17/2017    Acute right otitis media    Personal history of other diseases of the nervous system and sense organs 04/02/2015    History of acute otitis media    Personal history of pneumonia (recurrent) 07/17/2017    History of pneumonia    Rash and other nonspecific skin eruption 04/03/2015    Rash        Past medical, surgical, family and social history in the chart was reviewed and is accurate including any additions to what is in this HPI.    ROS:  14-point review of systems negative except as noted above.    OBJECTIVE:  Visit Vitals  /74   Pulse 64     Body mass index is 20.8 kg/m².  Physical Exam   General:  No acute distress  HEENT:  EOMI  CV:  Regular rate  Pulm:  Nonlabored  "respirations  Abd:  Soft, non-distended  :  Uncircumcised phallus with approx 20F narrow phimosis, unable to retract foreskin but able to visualize patent orthotopic meatus;  bl descended testes small volume no masses  MSK:  No contractures  Neuro:  Motor intact  Psych:  Appropriate affect    Labs:  Lab Results   Component Value Date    WBC 6.8 10/16/2024    HGB 17.6 (H) 10/16/2024    HCT 49.4 10/16/2024     10/16/2024    ALT 22 12/09/2021    AST 21 12/09/2021     10/16/2024    K 4.0 10/16/2024    CL 99 10/16/2024    CREATININE 1.24 10/16/2024    BUN 19 10/16/2024    CO2 31 10/16/2024    TSH 1.11 07/25/2024     No results found for: \"URINECULTURE\"   No results found for: \"PSA\"    IMAGING:  All imaging discussed in HPI was independently reviewed.    ASSESSMENT:  Bilateral hydronephrosis  Phimosis  CKD stage 2    Discussed risk of long term renal injury from bladder storage issues.  Need to assess bladder compliance to ensure bl hydro is not from poor compliance.    Discussed risk of infections, retention with phimotic foreskin.  Recommend circumcision if family agreeable and they are.    PLAN:  Urodynamics first available  Set up circumcision at next visit  Follow-up with me after urodynamics    James Barney MD    Problem List Items Addressed This Visit       Bilateral hydronephrosis - Primary     Other Visit Diagnoses       Bladder distention        Relevant Orders    POCT UA Automated manually resulted (Completed)    Post-Void Residual (Completed)    Urodynamic studies    Other symptoms and signs involving the genitourinary system        Relevant Orders    Post-Void Residual (Completed)    Acquired phimosis of penis                 "

## 2024-11-08 ENCOUNTER — PROCEDURE VISIT (OUTPATIENT)
Dept: UROLOGY | Facility: CLINIC | Age: 24
End: 2024-11-08
Payer: COMMERCIAL

## 2024-11-08 DIAGNOSIS — N32.89 BLADDER DISTENTION: ICD-10-CM

## 2024-11-08 PROCEDURE — 51728 CYSTOMETROGRAM W/VP: CPT | Performed by: NURSE PRACTITIONER

## 2024-11-08 PROCEDURE — 51797 INTRAABDOMINAL PRESSURE TEST: CPT | Performed by: NURSE PRACTITIONER

## 2024-11-08 PROCEDURE — 51784 ANAL/URINARY MUSCLE STUDY: CPT | Performed by: NURSE PRACTITIONER

## 2024-11-08 PROCEDURE — 51741 ELECTRO-UROFLOWMETRY FIRST: CPT | Performed by: NURSE PRACTITIONER

## 2024-11-08 NOTE — PROGRESS NOTES
Trevor Rivera 24 y.o. male  Procedures:  Patient referred by Dr. Barney for urodynamics to evaluate bladder distention. Fatimah Rodriguez CNP was present in office at time of study. Pre-uroflow was completed today with a pvr of 75 ml. Urine was clear for uti today. Patient did not leak on CLPP. Patient did have DO during study. Pvr after study was 0 ml.  Patient/Father instructed to increase fluids if he has any burning or blood in urine. Patient/Father made aware he may have blood rectally due to abdominal catheter insertion.  Patient/Father understood and consented to urodynamics. Patient will follow up with Dr. Barney to review results. 11/8/2024/william

## 2024-12-05 ENCOUNTER — APPOINTMENT (OUTPATIENT)
Facility: CLINIC | Age: 24
End: 2024-12-05
Payer: COMMERCIAL

## 2024-12-06 ENCOUNTER — TELEMEDICINE (OUTPATIENT)
Facility: CLINIC | Age: 24
End: 2024-12-06
Payer: COMMERCIAL

## 2024-12-06 DIAGNOSIS — N47.1 ACQUIRED PHIMOSIS OF PENIS: Primary | ICD-10-CM

## 2024-12-06 DIAGNOSIS — N13.30 BILATERAL HYDRONEPHROSIS: ICD-10-CM

## 2024-12-06 PROCEDURE — 99441 PR PHYS/QHP TELEPHONE EVALUATION 5-10 MIN: CPT | Performed by: STUDENT IN AN ORGANIZED HEALTH CARE EDUCATION/TRAINING PROGRAM

## 2024-12-06 PROCEDURE — 1036F TOBACCO NON-USER: CPT | Performed by: STUDENT IN AN ORGANIZED HEALTH CARE EDUCATION/TRAINING PROGRAM

## 2024-12-06 RX ORDER — CHLORHEXIDINE GLUCONATE 40 MG/ML
SOLUTION TOPICAL DAILY PRN
OUTPATIENT
Start: 2024-12-06

## 2024-12-06 RX ORDER — CEFAZOLIN SODIUM 2 G/100ML
2 INJECTION, SOLUTION INTRAVENOUS ONCE
OUTPATIENT
Start: 2024-12-06 | End: 2024-12-06

## 2024-12-06 NOTE — PROGRESS NOTES
Follow-up (UDS results)   Chief Complaint    Follow-up        Referring physician: No ref. provider found     Telephone follow up today conducted with patient's father as patient is minimally verbally communicative.  Consent granted.  Time of visit 7 minutes.    SUBJECTIVE:  HPI:   Trevor Rivera is a 24 y.o. male with a history of trisomy 21 (translocation), CKD2, Hashimoto thyroiditis, celiac disease who presents with bilateral hydronephrosis.  Patient minimally verbally communicative.  Telephone follow up with father today.  Reason for visit (1) bl hydronephrosis, (2) phimosis.    BL hydro:  Completed UDS which I reviewed.  Flow/pvr prior with avg flow 24ml/s, max 43ml/s, vol 378ml, pvr 75ml.  Excellent flow, adequate emptying.  UDS tracing notable for excellent compliance with low detrusor pressures at capacity 432ml (pdet 3.2 cm H2O), no leakage, one episode DO at low volume without leakage, appropriate voiding with lower flow rate - avg 12.1ml/s, max 33ml/s (lower avg likely bc recorded over full 1 min).  Summary:    Flow excellent  PVR 75ml  UDS excellent compliance, mild DO, no leak  Follows with nephrology, bl Cr 1.2 with GFR around 80  Phimosis:  no changes or issues since last visit.  Father and patient interested in proceeding with circumcision to prevent issues with voiding, hygiene and infections later in life.  Exam last visit approx 20F phimotic ring, unable to visualize all of glans.    From initial consult:  Has been following with nephrology for stage 2 CKD (GFR 60-90).  2022 had renal US that showed mild bl hydroureteronephrosis.  Repeat US 10/11/24 showed large distended bladder (1215ml) and stable mild bl hydroureteronephrosis.  Post void volume 89ml.  Dad says has always had ballooning of foreskin with urination but otherwise no urinary complaints.  Patient seems comfortable when voiding.  One prior UTI at 8 months of age, none since.  Did not follow with a pediatric urologist.  PVR 2ml  UA neg  "nit, neg BETINA    Past Medical History:   Diagnosis Date    Acute upper respiratory infection, unspecified 07/17/2017    Acute upper respiratory infection    Enteroviral vesicular stomatitis with exanthem 12/13/2017    Hand, foot and mouth disease    Hypothyroidism, unspecified 06/24/2013    Hypothyroidism    Otitis media, unspecified, right ear 07/17/2017    Acute right otitis media    Personal history of other diseases of the nervous system and sense organs 04/02/2015    History of acute otitis media    Personal history of pneumonia (recurrent) 07/17/2017    History of pneumonia    Rash and other nonspecific skin eruption 04/03/2015    Rash        Past medical, surgical, family and social history in the chart was reviewed and is accurate including any additions to what is in this HPI.    ROS:  14-point review of systems negative except as noted above.    OBJECTIVE:  There were no vitals taken for this visit.  There is no height or weight on file to calculate BMI.  Physical Exam   Unable to complete dt phone visit    Labs:  Lab Results   Component Value Date    WBC 6.8 10/16/2024    HGB 17.6 (H) 10/16/2024    HCT 49.4 10/16/2024     10/16/2024    ALT 22 12/09/2021    AST 21 12/09/2021     10/16/2024    K 4.0 10/16/2024    CL 99 10/16/2024    CREATININE 1.24 10/16/2024    BUN 19 10/16/2024    CO2 31 10/16/2024    TSH 1.11 07/25/2024     No results found for: \"URINECULTURE\"   No results found for: \"PSA\"    IMAGING:  All imaging discussed in HPI was independently reviewed.    ASSESSMENT:  Mild BL hydronephrosis, chronic, stable - no issue with bladder compliance or voiding on UDS  Discussed reassuring UDS results, need for continued annual surveillance with annual Cr and ROCKY - follow with both urology and nephrology  Phimosis, chronic, stable, not at goal  Discussed role of non-urgent elective circumcision to prevent issues with future voiding, infections, hygiene.  Father would like to proceed when the " weather is warmer.  Will move forward with scheduling and bring back for pre-op visit.    PLAN:  Schedule circumcision 4/14/25  Pre-op visit about 4 weeks prior  Request PAT visit  Post op 2 weeks wound check  Repeat Cr and ROCKY 10/2025    James Barney MD    Problem List Items Addressed This Visit       Acquired phimosis of penis - Primary    Relevant Orders    Case Request Operating Room: Circumcision (Completed)    Request for Pre-Admission Testing Visit

## 2025-03-11 ENCOUNTER — APPOINTMENT (OUTPATIENT)
Facility: CLINIC | Age: 25
End: 2025-03-11
Payer: COMMERCIAL

## 2025-03-11 VITALS
HEART RATE: 73 BPM | SYSTOLIC BLOOD PRESSURE: 123 MMHG | DIASTOLIC BLOOD PRESSURE: 81 MMHG | TEMPERATURE: 98.7 F | HEIGHT: 64 IN | BODY MASS INDEX: 21.37 KG/M2 | WEIGHT: 125.2 LBS

## 2025-03-11 DIAGNOSIS — N47.1 ACQUIRED PHIMOSIS OF PENIS: Primary | ICD-10-CM

## 2025-03-11 DIAGNOSIS — N13.30 BILATERAL HYDRONEPHROSIS: ICD-10-CM

## 2025-03-11 PROCEDURE — 1036F TOBACCO NON-USER: CPT | Performed by: STUDENT IN AN ORGANIZED HEALTH CARE EDUCATION/TRAINING PROGRAM

## 2025-03-11 PROCEDURE — 99213 OFFICE O/P EST LOW 20 MIN: CPT | Performed by: STUDENT IN AN ORGANIZED HEALTH CARE EDUCATION/TRAINING PROGRAM

## 2025-03-11 PROCEDURE — 3008F BODY MASS INDEX DOCD: CPT | Performed by: STUDENT IN AN ORGANIZED HEALTH CARE EDUCATION/TRAINING PROGRAM

## 2025-03-11 NOTE — PROGRESS NOTES
Chief complaint:  Pre-op Exam  Referring physician:  No ref. provider found     SUBJECTIVE:  HPI:  Trevor Rivera is a 24 y.o. male with a history of trisomy 21 (translocation), CKD2, Hashimoto thyroiditis, celiac disease, neurogenic bladder with bilateral hydronephrosis, phimosis who presents for pre-op visit ahead of circumcision 4/14.  Here with his father.    No interval issues.  Recall, chronic ballooning of foreskin with voiding, only 1 UTI ever at 8 months of age, did not follow with pediatric urology.  As previously, father and patient interested in proceeding with circumcision to prevent issues with voiding, hygiene and infections later in life. Exam last visit approx 20F phimotic ring, unable to visualize all of glans.     UDS 11/8/24 - capacity 432ml, excellent compliance, mild DO, no leak, excellent flow, PVR 75ml    Medical history:   has a past medical history of Acute upper respiratory infection, unspecified (07/17/2017), Enteroviral vesicular stomatitis with exanthem (12/13/2017), Hypothyroidism, unspecified (06/24/2013), Otitis media, unspecified, right ear (07/17/2017), Personal history of other diseases of the nervous system and sense organs (04/02/2015), Personal history of pneumonia (recurrent) (07/17/2017), and Rash and other nonspecific skin eruption (04/03/2015).   Surgical history:   has no past surgical history on file.  Family history:  family history includes Celiac disease in an other family member; Diabetes in his paternal grandmother; Down syndrome in an other family member.  Social history:   reports that he has never smoked. He has never used smokeless tobacco.    Medications:    Current Outpatient Medications   Medication Instructions    cholecalciferol (Vitamin D-3) 25 MCG (1000 UT) tablet oral, Daily    levothyroxine (SYNTHROID, LEVOXYL) 56 mcg, oral, Daily      Allergies:    No Known Allergies     ROS:  14-point review of systems negative except as noted  "above.    OBJECTIVE:  Visit Vitals  /81   Pulse 73   Temp 37.1 °C (98.7 °F)   Body mass index is 21.49 kg/m².    Physical exam  General:  No acute distress  HEENT:  EOMI  CV:  Regular rate  Pulm:  Nonlabored respirations  Abd:  Soft, non-distended  :  No suprapubic or CVA tenderness  MSK:  No contractures  Neuro:  Motor intact  Psych:  Appropriate affect    Labs:    Lab Results   Component Value Date    WBC 6.8 10/16/2024    HGB 17.6 (H) 10/16/2024    HCT 49.4 10/16/2024     10/16/2024    ALT 22 12/09/2021    AST 21 12/09/2021     10/16/2024    K 4.0 10/16/2024    CL 99 10/16/2024    CREATININE 1.24 10/16/2024    BUN 19 10/16/2024    CO2 31 10/16/2024   No results found for: \"URINECULTURE\" No results found for: \"PSA\"    Imaging:  All imaging discussed in HPI was independently reviewed.    ASSESSMENT:  Phimosis  Neurogenic voiding dysfunction with bilateral hydronephrosis - high risk    PLAN:  Circumcision 4/14/25 - reviewed risks, benefits, alternatives  PAT visit  Post op check 2 weeks  Annual Cr and ROCKY - next 10/2025    Follow-up - need to arrange 10/2025 surveillance visit    James Barney MD    Problem List Items Addressed This Visit       Bilateral hydronephrosis    Acquired phimosis of penis - Primary        "

## 2025-03-27 ENCOUNTER — APPOINTMENT (OUTPATIENT)
Facility: CLINIC | Age: 25
End: 2025-03-27
Payer: COMMERCIAL

## 2025-04-11 ENCOUNTER — PRE-ADMISSION TESTING (OUTPATIENT)
Dept: PREADMISSION TESTING | Facility: HOSPITAL | Age: 25
End: 2025-04-11
Payer: COMMERCIAL

## 2025-04-11 VITALS
RESPIRATION RATE: 16 BRPM | HEART RATE: 83 BPM | WEIGHT: 123.46 LBS | SYSTOLIC BLOOD PRESSURE: 134 MMHG | HEIGHT: 64 IN | DIASTOLIC BLOOD PRESSURE: 79 MMHG | BODY MASS INDEX: 21.08 KG/M2 | OXYGEN SATURATION: 98 % | TEMPERATURE: 97.5 F

## 2025-04-11 DIAGNOSIS — Z01.818 PREOP TESTING: Primary | ICD-10-CM

## 2025-04-11 LAB
ANION GAP SERPL CALC-SCNC: 11 MMOL/L (ref 10–20)
BUN SERPL-MCNC: 18 MG/DL (ref 6–23)
CALCIUM SERPL-MCNC: 9.1 MG/DL (ref 8.6–10.3)
CHLORIDE SERPL-SCNC: 102 MMOL/L (ref 98–107)
CO2 SERPL-SCNC: 29 MMOL/L (ref 21–32)
CREAT SERPL-MCNC: 1.19 MG/DL (ref 0.5–1.3)
EGFRCR SERPLBLD CKD-EPI 2021: 89 ML/MIN/1.73M*2
ERYTHROCYTE [DISTWIDTH] IN BLOOD BY AUTOMATED COUNT: 12.6 % (ref 11.5–14.5)
GLUCOSE SERPL-MCNC: 146 MG/DL (ref 74–99)
HCT VFR BLD AUTO: 48.6 % (ref 41–52)
HGB BLD-MCNC: 16.7 G/DL (ref 13.5–17.5)
MCH RBC QN AUTO: 33.5 PG (ref 26–34)
MCHC RBC AUTO-ENTMCNC: 34.4 G/DL (ref 32–36)
MCV RBC AUTO: 97 FL (ref 80–100)
NRBC BLD-RTO: 0 /100 WBCS (ref 0–0)
PLATELET # BLD AUTO: 186 X10*3/UL (ref 150–450)
POTASSIUM SERPL-SCNC: 4.1 MMOL/L (ref 3.5–5.3)
RBC # BLD AUTO: 4.99 X10*6/UL (ref 4.5–5.9)
SODIUM SERPL-SCNC: 138 MMOL/L (ref 136–145)
TSH SERPL-ACNC: 1.34 MIU/L (ref 0.44–3.98)
WBC # BLD AUTO: 5.2 X10*3/UL (ref 4.4–11.3)

## 2025-04-11 PROCEDURE — 99204 OFFICE O/P NEW MOD 45 MIN: CPT

## 2025-04-11 PROCEDURE — 85027 COMPLETE CBC AUTOMATED: CPT

## 2025-04-11 PROCEDURE — 36415 COLL VENOUS BLD VENIPUNCTURE: CPT

## 2025-04-11 PROCEDURE — 84443 ASSAY THYROID STIM HORMONE: CPT

## 2025-04-11 PROCEDURE — 93005 ELECTROCARDIOGRAM TRACING: CPT

## 2025-04-11 PROCEDURE — 80048 BASIC METABOLIC PNL TOTAL CA: CPT

## 2025-04-11 ASSESSMENT — DUKE ACTIVITY SCORE INDEX (DASI)
CAN YOU TAKE CARE OF YOURSELF (EAT, DRESS, BATHE, OR USE TOILET): YES
CAN YOU RUN A SHORT DISTANCE: YES
CAN YOU DO LIGHT WORK AROUND THE HOUSE LIKE DUSTING OR WASHING DISHES: YES
CAN YOU DO HEAVY WORK AROUND THE HOUSE LIKE SCRUBBING FLOORS OR LIFTING AND MOVING HEAVY FURNITURE: NO
CAN YOU WALK A BLOCK OR TWO ON LEVEL GROUND: YES
CAN YOU PARTICIPATE IN STRENOUS SPORTS LIKE SWIMMING, SINGLES TENNIS, FOOTBALL, BASKETBALL, OR SKIING: YES
CAN YOU DO YARD WORK LIKE RAKING LEAVES, WEEDING OR PUSHING A MOWER: YES
DASI METS SCORE: 8.3
CAN YOU CLIMB A FLIGHT OF STAIRS OR WALK UP A HILL: YES
CAN YOU DO MODERATE WORK AROUND THE HOUSE LIKE VACUUMING, SWEEPING FLOORS OR CARRYING GROCERIES: YES
CAN YOU PARTICIPATE IN MODERATE RECREATIONAL ACTIVITIES LIKE GOLF, BOWLING, DANCING, DOUBLES TENNIS OR THROWING A BASEBALL OR FOOTBALL: YES
CAN YOU WALK INDOORS, SUCH AS AROUND YOUR HOUSE: YES
CAN YOU HAVE SEXUAL RELATIONS: NO
TOTAL_SCORE: 44.95

## 2025-04-11 NOTE — PREPROCEDURE INSTRUCTIONS
One of our staff members will call you one business day before your surgery between 12-4pm to let you know the time to arrive at the hospital. If you have not received a phone call by 2pm call [phone].     When you arrive at the hospital, go to Registration on the ground floor.   You will need a responsible adult to drive you home.     Prior to surgery date:  One (1) week prior to surgery STOP:  -Stop aspirin products. Do not take NSAIDS/ Ibuprofen, Aleve, Motrin. Okay to take Tylenol or Acetaminophen. Do not take vitamins, supplements, herbals, diet pills.   -Stop these medications:   -No alcohol for 24 hours prior to surgery.  -No smoking 24 hours prior. No Marijuana, CBD oil, or Vaping 48 hours prior to surgery.  -Enjoy a light supper the evening before surgery.    Day of Surgery:  -Nothing to eat or drink after midnight. This includes food of any kind (including hard candy, cough drops, mints and gum, coffee).   -You can have the 13oz of Clear liquids 2hrs prior to surgery time.   -No acrylic nails or nail polish on at least one fingernail; no polish on toes for foot surgery.   -No body piercing or jewelry.   -Shower as directed. No deodorant, lotion, power, oils, perfume, make-up.   -Wear loose comfortable clothing to accommodate your bandages.        Medication List            Accurate as of April 11, 2025  8:20 AM. Always use your most recent med list.                levothyroxine 112 mcg tablet  Commonly known as: Synthroid, Levoxyl  Take 0.5 tablets (56 mcg) by mouth once daily.  Medication Adjustments for Surgery: Take/Use as prescribed

## 2025-04-11 NOTE — H&P (VIEW-ONLY)
CPM/PAT Evaluation       Name: Trevor Rivera (Trevor Rivera)  /Age: 2000/ y.o.     Visit Type:   In-Person       Chief Complaint: Issues with voiding that require intervention    HPI  Patient is a 24 year old male with a history of trisomy 21, CKD stage II, hashimoto hypothyroid, celiac, eczema and UTI who presents today with his dad for preoperative evaluation. Patient follows with Dr. Barney for acquired phimosis and issues with voiding. He is scheduled for circumcision on 25 with Dr. Barney. Patient is nonverbal so his father provides the entirety of HPI. Dad denies recent illness, fever, chills, fatigue, cough, shortness of breath, appearance of chest pain, lower extremity edema, urinary or GI symptoms for the patient.    Past Medical History:   Diagnosis Date    Acute upper respiratory infection, unspecified 2017    Acute upper respiratory infection    Celiac disease ([facility])     Chronic kidney disease     Enteroviral vesicular stomatitis with exanthem 2017    Hand, foot and mouth disease    Hypothyroidism, unspecified 2013    Hypothyroidism    Otitis media, unspecified, right ear 2017    Acute right otitis media    Personal history of other diseases of the nervous system and sense organs 2015    History of acute otitis media    Personal history of pneumonia (recurrent) 2017    History of pneumonia    Rash and other nonspecific skin eruption 2015    Rash       Past Surgical History:   Procedure Laterality Date    UPPER GASTROINTESTINAL ENDOSCOPY         Patient  has no history on file for sexual activity.    Family History   Problem Relation Name Age of Onset    Diabetes Paternal Grandmother         No Known Allergies    Prior to Admission medications    Medication Sig Start Date End Date Taking? Authorizing Provider   cholecalciferol (Vitamin D-3) 25 MCG (1000 UT) tablet TAKE 1 TABLET (25 MCG) BY MOUTH DAILY 23   RAYMOND Myers-CNP  "  levothyroxine (Synthroid, Levoxyl) 112 mcg tablet Take 0.5 tablets (56 mcg) by mouth once daily. 7/25/24   Chavo Munoz MD        A ten-point review of systems is limited due to patient being nonverbal with trisomy 21. Dad provides ROS to the best of his knowledge and answers are otherwise negative except for what is mentioned in the HPI above.     Physical Exam:    GENERAL: Well developed young male with down syndrome, awake/alert/oriented x3, no distress, alert and cooperative  HEENT: MMM  NECK: Trachea midline, no lymphadenopathy  CARDIOVASCULAR: RRR, normal S1 and S 2, no murmurs, 2+ equal pulses of the extremities  RESPIRATORY: Patent airways, CTAB, normal breath sounds with good chest expansion, thorax symmetric  ABDOMEN: no distention  SKIN: Warm and dry  EXTREMITIES: No cyanosis, edema  NEURO: A&O at baseline, follows commands, nonverbal, smiles with some interaction, normal motor, no focal deficits   PSYCH: Appropriate mood and behavior      Visit Vitals  /79   Pulse 83   Temp 36.4 °C (97.5 °F)   Resp 16   Ht 1.626 m (5' 4\")   Wt 56 kg (123 lb 7.3 oz)   SpO2 98%   BMI 21.19 kg/m²   Smoking Status Never   BSA 1.59 m²       DASI Risk Score      Flowsheet Row Pre-Admission Testing from 4/11/2025 in [facility]   Can you take care of yourself (eat, dress, bathe, or use toilet)?  2.75 filed at 04/11/2025 0807   Can you walk indoors, such as around your house? 1.75 filed at 04/11/2025 0807   Can you walk a block or two on level ground?  2.75 filed at 04/11/2025 0807   Can you climb a flight of stairs or walk up a hill? 5.5 filed at 04/11/2025 0807   Can you run a short distance? 8 filed at 04/11/2025 0807   Can you do light work around the house like dusting or washing dishes? 2.7 filed at 04/11/2025 0807   Can you do moderate work around the house like vacuuming, sweeping floors or carrying groceries? 3.5 filed at 04/11/2025 0807   Can you do heavy work around the house like scrubbing " floors or lifting and moving heavy furniture?  0 filed at 04/11/2025 0807   Can you do yard work like raking leaves, weeding or pushing a mower? 4.5 filed at 04/11/2025 0807   Can you have sexual relations? 0 filed at 04/11/2025 0807   Can you participate in moderate recreational activities like golf, bowling, dancing, doubles tennis or throwing a baseball or football? 6 filed at 04/11/2025 0807   Can you participate in strenous sports like swimming, singles tennis, football, basketball, or skiing? 7.5 filed at 04/11/2025 0807   DASI SCORE 44.95 filed at 04/11/2025 0807   METS Score (Will be calculated only when all the questions are answered) 8.3 filed at 04/11/2025 0807          Caprini DVT Assessment    No data to display       Modified Frailty Index    No data to display       [id number] Stroke Risk Points  Current as of just now        N/A 0 to 9 Points:      Last Change: N/A          The [id number] risk score (Lip VIRGIE, et al. 2009. © 2010 [employer]) quantifies the risk of stroke for a patient with atrial fibrillation. For patients without atrial fibrillation or under the age of 18 this score appears as N/A. Higher score values generally indicate higher risk of stroke.        This score is not applicable to this patient. Components are not calculated.          Revised Cardiac Risk Index    No data to display       Apfel Simplified Score    No data to display       Risk Analysis Index Results This Encounter    No data found in the last 10 encounters.       Stop Bang Score      Flowsheet Row Pre-Admission Testing from 4/11/20[facility] in [facility]   Do you snore loudly? 1 filed at 04/11/2025 0807   Do you often feel tired or fatigued after your sleep? 0 filed at 04/11/2025 0807   Has anyone ever observed you stop breathing in your sleep? 0 filed at 04/11/2025 0807   Do you have or are you being treated for high blood pressure? 0 filed at 04/11/2025 0807   Recent BMI  (Calculated) 21.2 filed at 04/11/2025 0807   Is BMI greater than 35 kg/m2? 0=No filed at 04/11/2025 0807   Age older than 50 years old? 0=No filed at 04/11/2025 0807   Is your neck circumference greater than 17 inches (Male) or 16 inches (Female)? 0 filed at 04/11/2025 0807   Gender - Male 1=Yes filed at 04/11/2025 0807   STOP-BANG Total Score 2 filed at 04/11/2025 0807          Prodigy: High Risk  Total Score: 8              Prodigy Gender Score          ARISCAT Score for Postoperative Pulmonary Complications      Flowsheet Row Pre-Admission Testing from 4/11/2025 in [facility]   Age Calculated Score 0 filed at 04/11/2025 0819   Preoperative SpO2 0 filed at 04/11/2025 0819   Respiratory infection in the last month Either upper or lower (i.e., URI, bronchitis, pneumonia), with fever and antibiotic treatment 0 filed at 04/11/2025 0819   Preoperative anemia (Hgb less than 10 g/dl) 0 filed at 04/11/2025 0819   Surgical incision  0 filed at 04/11/2025 0819   Duration of surgery  0 filed at 04/11/2025 0819   Emergency Procedure  0 filed at 04/11/2025 0819   ARISCAT Total Score  0 filed at 04/11/2025 0819          Porter Perioperative Risk for Myocardial Infarction or Cardiac Arrest (LEWIS)      Flowsheet Row Pre-Admission Testing from 4/11/2025 in [facility]   Calculated Age Score 0.44 filed at 04/11/2025 0819   Functional Status  0 filed at 04/11/2025 0819   ASA Class  -3.29 filed at 04/11/2025 0819   Creatinine 0 filed at 04/11/2025 0819   Type of Procedure  -0.26 filed at 04/11/2025 0819   LEWIS Total Score  -8.36 filed at 04/11/2025 0819   LEWIS % 0.02 filed at 04/11/2025 0819            Assessment and Plan:   Patient is a 24 year old male with a history of trisomy 21, CKD stage II, hashimoto hypothyroid, celiac, eczema and UTI.    #Acquired phimosis   He is scheduled for circumcision on 4/14/25 with Dr. Barney    #CKD stage II, Hx  Follows with nephrology - last seen in October of 2024  RFP  from 10/16/24 normal  Renal ultrasound from 10/16/24 shows mild hydronephrosis bilaterally and prominent distention of the urinary bladder  Repeat BMP obtained today and pending    #Hashimoto's hypothyroid, Hx  Follows with endocrinology - last seen 7/25/24   TSH, free T and TTG antibody normal at that visit on 56 mcg  Currently patient maintained on 56 mcg levothyroxine (1/2 of 112 mcg tab)  Repeat TSH obtained today and pending    #Celiac, Hx  Diet controled without issues    #UTI, Hx  Only 1 UTI when he was ~ [age] year old  UA from 11/6/24 with Dr. Barney was negative  Patient's dad denies appearance of pain or discomfort with urination  No systemic signs of infection such as fever, chills or fatigue    Labs obtained today and pending (cbc, bmp, tsh)  EKG obtained today demonstrates normal sinus rhythm with possible left atrial enlargement    I spent 45 minutes in the professional and overall care of this patient. Greater than 50% of this time was spent counseling patient, reviewing plan of care and discussing medication perioperative management.    Kate George, APRN-CNP

## 2025-04-11 NOTE — CPM/PAT H&P
CPM/PAT Evaluation       Name: Trevor Rivera (Trevor Rivera)  /Age: 2000/ y.o.     Visit Type:   In-Person       Chief Complaint: Issues with voiding that require intervention    HPI  Patient is a 24 year old male with a history of trisomy 21, CKD stage II, hashimoto hypothyroid, celiac, eczema and UTI who presents today with his dad for preoperative evaluation. Patient follows with Dr. Barney for acquired phimosis and issues with voiding. He is scheduled for circumcision on 25 with Dr. Barney. Patient is nonverbal so his father provides the entirety of HPI. Dad denies recent illness, fever, chills, fatigue, cough, shortness of breath, appearance of chest pain, lower extremity edema, urinary or GI symptoms for the patient.    Past Medical History:   Diagnosis Date    Acute upper respiratory infection, unspecified 2017    Acute upper respiratory infection    Celiac disease ([facility])     Chronic kidney disease     Enteroviral vesicular stomatitis with exanthem 2017    Hand, foot and mouth disease    Hypothyroidism, unspecified 2013    Hypothyroidism    Otitis media, unspecified, right ear 2017    Acute right otitis media    Personal history of other diseases of the nervous system and sense organs 2015    History of acute otitis media    Personal history of pneumonia (recurrent) 2017    History of pneumonia    Rash and other nonspecific skin eruption 2015    Rash       Past Surgical History:   Procedure Laterality Date    UPPER GASTROINTESTINAL ENDOSCOPY         Patient  has no history on file for sexual activity.    Family History   Problem Relation Name Age of Onset    Diabetes Paternal Grandmother         No Known Allergies    Prior to Admission medications    Medication Sig Start Date End Date Taking? Authorizing Provider   cholecalciferol (Vitamin D-3) 25 MCG (1000 UT) tablet TAKE 1 TABLET (25 MCG) BY MOUTH DAILY 23   RAYMOND Myers-CNP  "  levothyroxine (Synthroid, Levoxyl) 112 mcg tablet Take 0.5 tablets (56 mcg) by mouth once daily. 7/25/24   Chavo Munoz MD        A ten-point review of systems is limited due to patient being nonverbal with trisomy 21. Dad provides ROS to the best of his knowledge and answers are otherwise negative except for what is mentioned in the HPI above.     Physical Exam:    GENERAL: Well developed young male with down syndrome, awake/alert/oriented x3, no distress, alert and cooperative  HEENT: MMM  NECK: Trachea midline, no lymphadenopathy  CARDIOVASCULAR: RRR, normal S1 and S 2, no murmurs, 2+ equal pulses of the extremities  RESPIRATORY: Patent airways, CTAB, normal breath sounds with good chest expansion, thorax symmetric  ABDOMEN: no distention  SKIN: Warm and dry  EXTREMITIES: No cyanosis, edema  NEURO: A&O at baseline, follows commands, nonverbal, smiles with some interaction, normal motor, no focal deficits   PSYCH: Appropriate mood and behavior      Visit Vitals  /79   Pulse 83   Temp 36.4 °C (97.5 °F)   Resp 16   Ht 1.626 m (5' 4\")   Wt 56 kg (123 lb 7.3 oz)   SpO2 98%   BMI 21.19 kg/m²   Smoking Status Never   BSA 1.59 m²       DASI Risk Score      Flowsheet Row Pre-Admission Testing [facility] 4/11/[facility] in [facility]   Can you take care of yourself (eat, dress, bathe, or use toilet)?  2.75 filed at 04/11/2025 0807   Can you walk indoors, such as around your house? 1.75 filed at 04/11/2025 0807   Can you walk a block or two on level ground?  2.75 filed at 04/11/2025 0807   Can you climb a flight of stairs or walk up a hill? 5.5 filed at 04/11/2025 0807   Can you run a short distance? 8 filed at 04/11/2025 0807   Can you do light work around the house like dusting or washing dishes? 2.7 filed at 04/11/2025 0807   Can you do moderate work around the house like vacuuming, sweeping floors or carrying groceries? 3.5 filed at 04/11/2025 0807   Can you do heavy work around the house like scrubbing " floors or lifting and moving heavy furniture?  0 filed at 04/11/2025 0807   Can you do yard work like raking leaves, weeding or pushing a mower? 4.5 filed at 04/11/2025 0807   Can you have sexual relations? 0 filed at 04/11/2025 0807   Can you participate in moderate recreational activities like golf, bowling, dancing, doubles tennis or throwing a baseball or football? 6 filed at 04/11/2025 0807   Can you participate in strenous sports like swimming, singles tennis, football, basketball, or skiing? 7.5 filed at 04/11/2025 0807   DASI SCORE 44.95 filed at 04/11/2025 0807   METS Score (Will be calculated only when all the questions are answered) 8.3 filed at 04/11/2025 0807          Caprini DVT Assessment    No data to display       Modified Frailty Index    No data to display       [id number] Stroke Risk Points  Current as of just now        N/A 0 to 9 Points:      Last Change: N/A          The [id number] risk score (Lip VIRGIE, et al. 2009. © 2010 [employer]) quantifies the risk of stroke for a patient with atrial fibrillation. For patients without atrial fibrillation or under the age of 18 this score appears as N/A. Higher score values generally indicate higher risk of stroke.        This score is not applicable to this patient. Components are not calculated.          Revised Cardiac Risk Index    No data to display       Apfel Simplified Score    No data to display       Risk Analysis Index Results This Encounter    No data found in the last 10 encounters.       Stop Bang Score      Flowsheet Row Pre-Admission Testing from 4/11/2025 in [facility]   Do you snore loudly? 1 filed at 04/11/2025 0807   Do you often feel tired or fatigued after your sleep? 0 filed at 04/11/2025 0807   Has anyone ever observed you stop breathing in your sleep? 0 filed at 04/11/2025 0807   Do you have or are you being treated for high blood pressure? 0 filed at 04/11/2025 0807   Recent BMI  (Calculated) 21.2 filed at 04/11/2025 0807   Is BMI greater than 35 kg/m2? 0=No filed at 04/11/2025 0807   Age older than 50 years old? 0=No filed at 04/11/2025 0807   Is your neck circumference greater than 17 inches (Male) or 16 inches (Female)? 0 filed at 04/11/2025 0807   Gender - Male 1=Yes filed at 04/11/2025 0807   STOP-BANG Total Score 2 filed at 04/11/2025 0807          Prodigy: High Risk  Total Score: 8              Prodigy Gender Score          ARISCAT Score for Postoperative Pulmonary Complications      Flowsheet Row Pre-Admission Testing from 4/11/2025 in [facility]   Age Calculated Score 0 filed at 04/11/2025 0819   Preoperative SpO2 0 filed at 04/11/2025 0819   Respiratory infection in the last month Either upper or lower (i.e., URI, bronchitis, pneumonia), with fever and antibiotic treatment 0 filed at 04/11/2025 0819   Preoperative anemia (Hgb less than 10 g/dl) 0 filed at 04/11/2025 0819   Surgical incision  0 filed at 04/11/2025 0819   Duration of surgery  0 filed at 04/11/2025 0819   Emergency Procedure  0 filed at 04/11/2025 0819   ARISCAT Total Score  0 filed at 04/11/2025 0819          Porter Perioperative Risk for Myocardial Infarction or Cardiac Arrest (LEWIS)      Flowsheet Row Pre-Admission Testing from 4/11/2025 in [facility]   Calculated Age Score 0.44 filed at 04/11/2025 0819   Functional Status  0 filed at 04/11/2025 0819   ASA Class  -3.29 filed at 04/11/2025 0819   Creatinine 0 filed at 04/11/2025 0819   Type of Procedure  -0.26 filed at 04/11/2025 0819   LEWIS Total Score  -8.36 filed at 04/11/2025 0819   LEWIS % 0.02 filed at 04/11/2025 0819            Assessment and Plan:   Patient is a 24 year old male with a history of trisomy 21, CKD stage II, hashimoto hypothyroid, celiac, eczema and UTI.    #Acquired phimosis   He is scheduled for circumcision on 4/14/25 with Dr. Barney    #CKD stage II, Hx  Follows with nephrology - last seen in October of 2024  RFP  from 10/16/24 normal  Renal ultrasound from 10/16/24 shows mild hydronephrosis bilaterally and prominent distention of the urinary bladder  Repeat BMP obtained today and pending    #Hashimoto's hypothyroid, Hx  Follows with endocrinology - last seen 7/25/24   TSH, free T and TTG antibody normal at that visit on 56 mcg  Currently patient maintained on 56 mcg levothyroxine (1/2 of 112 mcg tab)  Repeat TSH obtained today and pending    #Celiac, Hx  Diet controled without issues    #UTI, Hx  Only 1 UTI when he was ~ [age] year old  UA from 11/6/24 with Dr. Barney was negative  Patient's dad denies appearance of pain or discomfort with urination  No systemic signs of infection such as fever, chills or fatigue    Labs obtained today and pending (cbc, bmp, tsh)  EKG obtained today demonstrates normal sinus rhythm with possible left atrial enlargement    I spent 45 minutes in the professional and overall care of this patient. Greater than 50% of this time was spent counseling patient, reviewing plan of care and discussing medication perioperative management.    Kate George, APRN-CNP

## 2025-04-14 ENCOUNTER — ANESTHESIA EVENT (OUTPATIENT)
Dept: OPERATING ROOM | Facility: HOSPITAL | Age: 25
End: 2025-04-14
Payer: COMMERCIAL

## 2025-04-14 ENCOUNTER — HOSPITAL ENCOUNTER (OUTPATIENT)
Facility: HOSPITAL | Age: 25
Setting detail: OUTPATIENT SURGERY
Discharge: HOME | End: 2025-04-14
Attending: STUDENT IN AN ORGANIZED HEALTH CARE EDUCATION/TRAINING PROGRAM | Admitting: STUDENT IN AN ORGANIZED HEALTH CARE EDUCATION/TRAINING PROGRAM
Payer: COMMERCIAL

## 2025-04-14 ENCOUNTER — ANESTHESIA (OUTPATIENT)
Dept: OPERATING ROOM | Facility: HOSPITAL | Age: 25
End: 2025-04-14
Payer: COMMERCIAL

## 2025-04-14 VITALS
HEART RATE: 70 BPM | TEMPERATURE: 96.8 F | OXYGEN SATURATION: 99 % | DIASTOLIC BLOOD PRESSURE: 72 MMHG | SYSTOLIC BLOOD PRESSURE: 101 MMHG | RESPIRATION RATE: 16 BRPM

## 2025-04-14 DIAGNOSIS — N47.1 ACQUIRED PHIMOSIS OF PENIS: Primary | ICD-10-CM

## 2025-04-14 PROCEDURE — 2500000005 HC RX 250 GENERAL PHARMACY W/O HCPCS: Performed by: STUDENT IN AN ORGANIZED HEALTH CARE EDUCATION/TRAINING PROGRAM

## 2025-04-14 PROCEDURE — 3600000007 HC OR TIME - EACH INCREMENTAL 1 MINUTE - PROCEDURE LEVEL TWO: Performed by: STUDENT IN AN ORGANIZED HEALTH CARE EDUCATION/TRAINING PROGRAM

## 2025-04-14 PROCEDURE — 7100000010 HC PHASE TWO TIME - EACH INCREMENTAL 1 MINUTE: Performed by: STUDENT IN AN ORGANIZED HEALTH CARE EDUCATION/TRAINING PROGRAM

## 2025-04-14 PROCEDURE — 2500000004 HC RX 250 GENERAL PHARMACY W/ HCPCS (ALT 636 FOR OP/ED)

## 2025-04-14 PROCEDURE — 3700000001 HC GENERAL ANESTHESIA TIME - INITIAL BASE CHARGE: Performed by: STUDENT IN AN ORGANIZED HEALTH CARE EDUCATION/TRAINING PROGRAM

## 2025-04-14 PROCEDURE — 7100000009 HC PHASE TWO TIME - INITIAL BASE CHARGE: Performed by: STUDENT IN AN ORGANIZED HEALTH CARE EDUCATION/TRAINING PROGRAM

## 2025-04-14 PROCEDURE — 7100000001 HC RECOVERY ROOM TIME - INITIAL BASE CHARGE: Performed by: STUDENT IN AN ORGANIZED HEALTH CARE EDUCATION/TRAINING PROGRAM

## 2025-04-14 PROCEDURE — A54161 PR CIRCUMCISION,OTHR: Performed by: ANESTHESIOLOGY

## 2025-04-14 PROCEDURE — 2720000007 HC OR 272 NO HCPCS: Performed by: STUDENT IN AN ORGANIZED HEALTH CARE EDUCATION/TRAINING PROGRAM

## 2025-04-14 PROCEDURE — 7100000002 HC RECOVERY ROOM TIME - EACH INCREMENTAL 1 MINUTE: Performed by: STUDENT IN AN ORGANIZED HEALTH CARE EDUCATION/TRAINING PROGRAM

## 2025-04-14 PROCEDURE — 3700000002 HC GENERAL ANESTHESIA TIME - EACH INCREMENTAL 1 MINUTE: Performed by: STUDENT IN AN ORGANIZED HEALTH CARE EDUCATION/TRAINING PROGRAM

## 2025-04-14 PROCEDURE — 2500000004 HC RX 250 GENERAL PHARMACY W/ HCPCS (ALT 636 FOR OP/ED): Performed by: STUDENT IN AN ORGANIZED HEALTH CARE EDUCATION/TRAINING PROGRAM

## 2025-04-14 PROCEDURE — A54161 PR CIRCUMCISION,OTHR: Performed by: NURSE ANESTHETIST, CERTIFIED REGISTERED

## 2025-04-14 PROCEDURE — 3600000002 HC OR TIME - INITIAL BASE CHARGE - PROCEDURE LEVEL TWO: Performed by: STUDENT IN AN ORGANIZED HEALTH CARE EDUCATION/TRAINING PROGRAM

## 2025-04-14 PROCEDURE — 54161 CIRCUM 28 DAYS OR OLDER: CPT | Performed by: STUDENT IN AN ORGANIZED HEALTH CARE EDUCATION/TRAINING PROGRAM

## 2025-04-14 RX ORDER — MIDAZOLAM HYDROCHLORIDE 1 MG/ML
1 INJECTION, SOLUTION INTRAMUSCULAR; INTRAVENOUS ONCE AS NEEDED
Status: DISCONTINUED | OUTPATIENT
Start: 2025-04-14 | End: 2025-04-14 | Stop reason: HOSPADM

## 2025-04-14 RX ORDER — CHLORHEXIDINE GLUCONATE 40 MG/ML
SOLUTION TOPICAL DAILY PRN
Status: DISCONTINUED | OUTPATIENT
Start: 2025-04-14 | End: 2025-04-14 | Stop reason: HOSPADM

## 2025-04-14 RX ORDER — LIDOCAINE HCL/PF 100 MG/5ML
SYRINGE (ML) INTRAVENOUS AS NEEDED
Status: DISCONTINUED | OUTPATIENT
Start: 2025-04-14 | End: 2025-04-14

## 2025-04-14 RX ORDER — DEXMEDETOMIDINE HYDROCHLORIDE 100 UG/ML
INJECTION, SOLUTION INTRAVENOUS AS NEEDED
Status: DISCONTINUED | OUTPATIENT
Start: 2025-04-14 | End: 2025-04-14

## 2025-04-14 RX ORDER — LIDOCAINE HYDROCHLORIDE 10 MG/ML
INJECTION, SOLUTION INFILTRATION; PERINEURAL AS NEEDED
Status: DISCONTINUED | OUTPATIENT
Start: 2025-04-14 | End: 2025-04-14 | Stop reason: HOSPADM

## 2025-04-14 RX ORDER — PHENYLEPHRINE HCL IN 0.9% NACL 1 MG/10 ML
SYRINGE (ML) INTRAVENOUS AS NEEDED
Status: DISCONTINUED | OUTPATIENT
Start: 2025-04-14 | End: 2025-04-14

## 2025-04-14 RX ORDER — SODIUM CHLORIDE, SODIUM LACTATE, POTASSIUM CHLORIDE, CALCIUM CHLORIDE 600; 310; 30; 20 MG/100ML; MG/100ML; MG/100ML; MG/100ML
100 INJECTION, SOLUTION INTRAVENOUS CONTINUOUS
Status: DISCONTINUED | OUTPATIENT
Start: 2025-04-14 | End: 2025-04-14 | Stop reason: HOSPADM

## 2025-04-14 RX ORDER — PROPOFOL 10 MG/ML
INJECTION, EMULSION INTRAVENOUS AS NEEDED
Status: DISCONTINUED | OUTPATIENT
Start: 2025-04-14 | End: 2025-04-14

## 2025-04-14 RX ORDER — MEPERIDINE HYDROCHLORIDE 50 MG/ML
12.5 INJECTION INTRAMUSCULAR; INTRAVENOUS; SUBCUTANEOUS EVERY 10 MIN PRN
Status: DISCONTINUED | OUTPATIENT
Start: 2025-04-14 | End: 2025-04-14 | Stop reason: HOSPADM

## 2025-04-14 RX ORDER — ONDANSETRON HYDROCHLORIDE 2 MG/ML
4 INJECTION, SOLUTION INTRAVENOUS ONCE AS NEEDED
Status: DISCONTINUED | OUTPATIENT
Start: 2025-04-14 | End: 2025-04-14 | Stop reason: HOSPADM

## 2025-04-14 RX ORDER — ONDANSETRON HYDROCHLORIDE 2 MG/ML
INJECTION, SOLUTION INTRAVENOUS AS NEEDED
Status: DISCONTINUED | OUTPATIENT
Start: 2025-04-14 | End: 2025-04-14

## 2025-04-14 RX ORDER — LABETALOL HYDROCHLORIDE 5 MG/ML
5 INJECTION, SOLUTION INTRAVENOUS ONCE AS NEEDED
Status: DISCONTINUED | OUTPATIENT
Start: 2025-04-14 | End: 2025-04-14 | Stop reason: HOSPADM

## 2025-04-14 RX ORDER — LIDOCAINE HYDROCHLORIDE 10 MG/ML
0.1 INJECTION, SOLUTION INFILTRATION; PERINEURAL ONCE
Status: DISCONTINUED | OUTPATIENT
Start: 2025-04-14 | End: 2025-04-14 | Stop reason: HOSPADM

## 2025-04-14 RX ORDER — ALBUTEROL SULFATE 0.83 MG/ML
2.5 SOLUTION RESPIRATORY (INHALATION) ONCE AS NEEDED
Status: DISCONTINUED | OUTPATIENT
Start: 2025-04-14 | End: 2025-04-14 | Stop reason: HOSPADM

## 2025-04-14 RX ORDER — FENTANYL CITRATE 50 UG/ML
INJECTION, SOLUTION INTRAMUSCULAR; INTRAVENOUS AS NEEDED
Status: DISCONTINUED | OUTPATIENT
Start: 2025-04-14 | End: 2025-04-14

## 2025-04-14 RX ORDER — MIDAZOLAM HYDROCHLORIDE 1 MG/ML
INJECTION, SOLUTION INTRAMUSCULAR; INTRAVENOUS AS NEEDED
Status: DISCONTINUED | OUTPATIENT
Start: 2025-04-14 | End: 2025-04-14

## 2025-04-14 RX ORDER — HYDRALAZINE HYDROCHLORIDE 20 MG/ML
5 INJECTION INTRAMUSCULAR; INTRAVENOUS EVERY 30 MIN PRN
Status: DISCONTINUED | OUTPATIENT
Start: 2025-04-14 | End: 2025-04-14 | Stop reason: HOSPADM

## 2025-04-14 RX ORDER — CEFAZOLIN SODIUM 2 G/100ML
2 INJECTION, SOLUTION INTRAVENOUS ONCE
Status: COMPLETED | OUTPATIENT
Start: 2025-04-14 | End: 2025-04-14

## 2025-04-14 RX ORDER — ACETAMINOPHEN 325 MG/1
650 TABLET ORAL EVERY 4 HOURS PRN
Status: DISCONTINUED | OUTPATIENT
Start: 2025-04-14 | End: 2025-04-14 | Stop reason: HOSPADM

## 2025-04-14 RX ORDER — SODIUM CHLORIDE 0.9 G/100ML
INJECTION, SOLUTION IRRIGATION AS NEEDED
Status: DISCONTINUED | OUTPATIENT
Start: 2025-04-14 | End: 2025-04-14 | Stop reason: HOSPADM

## 2025-04-14 RX ORDER — BUPIVACAINE HYDROCHLORIDE 5 MG/ML
INJECTION, SOLUTION PERINEURAL AS NEEDED
Status: DISCONTINUED | OUTPATIENT
Start: 2025-04-14 | End: 2025-04-14 | Stop reason: HOSPADM

## 2025-04-14 RX ADMIN — DEXMEDETOMIDINE 8 MCG: 200 INJECTION, SOLUTION INTRAVENOUS at 08:10

## 2025-04-14 RX ADMIN — PROPOFOL 160 MG: 10 INJECTION, EMULSION INTRAVENOUS at 08:13

## 2025-04-14 RX ADMIN — Medication 50 MCG: at 09:16

## 2025-04-14 RX ADMIN — DEXAMETHASONE SODIUM PHOSPHATE 36 MG: 4 INJECTION, SOLUTION INTRAMUSCULAR; INTRAVENOUS at 08:24

## 2025-04-14 RX ADMIN — FENTANYL CITRATE 25 MCG: 50 INJECTION, SOLUTION INTRAMUSCULAR; INTRAVENOUS at 08:29

## 2025-04-14 RX ADMIN — ONDANSETRON 4 MG: 2 INJECTION INTRAMUSCULAR; INTRAVENOUS at 09:07

## 2025-04-14 RX ADMIN — FENTANYL CITRATE 25 MCG: 50 INJECTION, SOLUTION INTRAMUSCULAR; INTRAVENOUS at 08:11

## 2025-04-14 RX ADMIN — CEFAZOLIN SODIUM 2 G: 2 INJECTION, SOLUTION INTRAVENOUS at 08:19

## 2025-04-14 RX ADMIN — SODIUM CHLORIDE, SODIUM LACTATE, POTASSIUM CHLORIDE, AND CALCIUM CHLORIDE: .6; .31; .03; .02 INJECTION, SOLUTION INTRAVENOUS at 08:09

## 2025-04-14 RX ADMIN — MIDAZOLAM 2 MG: 1 INJECTION INTRAMUSCULAR; INTRAVENOUS at 08:07

## 2025-04-14 RX ADMIN — LIDOCAINE HYDROCHLORIDE 100 MG: 20 INJECTION INTRAVENOUS at 08:13

## 2025-04-14 RX ADMIN — Medication 50 MCG: at 09:21

## 2025-04-14 SDOH — HEALTH STABILITY: MENTAL HEALTH: CURRENT SMOKER: 0

## 2025-04-14 ASSESSMENT — COLUMBIA-SUICIDE SEVERITY RATING SCALE - C-SSRS
1. IN THE PAST MONTH, HAVE YOU WISHED YOU WERE DEAD OR WISHED YOU COULD GO TO SLEEP AND NOT WAKE UP?: NO
6. HAVE YOU EVER DONE ANYTHING, STARTED TO DO ANYTHING, OR PREPARED TO DO ANYTHING TO END YOUR LIFE?: NO
2. HAVE YOU ACTUALLY HAD ANY THOUGHTS OF KILLING YOURSELF?: NO

## 2025-04-14 ASSESSMENT — PAIN SCALES - GENERAL: PAIN_LEVEL: 1

## 2025-04-14 NOTE — ANESTHESIA POSTPROCEDURE EVALUATION
Patient: Trevor Rivera    Procedure Summary       Date: 04/14/25 Room / Location: [facility] OR 03 / Virtual [facility] OR    Anesthesia Start: 0809 Anesthesia Stop: 0938    Procedure: CIRCUMCISION Diagnosis:       Acquired phimosis of penis      (Acquired phimosis of penis [N47.1])    Surgeons: James Barney MD Responsible Provider: Christiano Montero MD    Anesthesia Type: general ASA Status: 3            Anesthesia Type: general    Vitals Value Taken Time   BP 99/63 04/14/25 1030   Temp 36 °C (96.8 °F) 04/14/25 0937   Pulse 77 04/14/25 1030   Resp 14 04/14/25 1030   SpO2 96 % 04/14/25 1030       Anesthesia Post Evaluation    Patient location during evaluation: PACU  Patient participation: complete - patient participated  Level of consciousness: awake and alert  Pain score: 1  Pain management: adequate  Airway patency: patent  Cardiovascular status: acceptable  Respiratory status: acceptable  Hydration status: acceptable  Postoperative Nausea and Vomiting: none    No notable events documented.

## 2025-04-14 NOTE — OP NOTE
CIRCUMCISION Operative Note     Date: 2025  OR Location: [facility]    Name: Trevor Rivera, : 2000, Age: 24 y.o., MRN: [id number], Sex: male    Diagnosis  Pre-op Diagnosis      * Acquired phimosis of penis [N47.1] Post-op Diagnosis     * Acquired phimosis of penis [N47.1]     Procedures  Circumcision  Surgeons      * James Barney - Primary    Resident/Fellow/Other Assistant:  Surgeons and Role:  * No surgeons found with a matching role *    Staff:   Shawnulator: Vero Gray Person: Shereen  Surgical Assistant: Emerald    Anesthesia Staff: Anesthesiologist: Christiano Montero MD  CRNA: TOOTIE Dailey DNP  SRNA: Sergei Hylton    Procedure Summary  Anesthesia: General  ASA: III  Estimated Blood Loss: 10mL  Intra-op Medications:   Administrations occurring from 0800 to 0930 on 25:   Medication Name Total Dose   sodium chloride 0.9 % irrigation solution 1,000 mL   lidocaine (Xylocaine) 10 mg/mL (1 %) injection 10 mL   BUPivacaine HCl (Marcaine) 0.5 % (5 mg/mL) injection 10 mL   ceFAZolin (Ancef) 2 g in dextrose (iso)  mL 2 g   dexAMETHasone (Decadron) injection 4 mg/mL 36 mg   dexmedeTOMIDine (Precedex) 100 mcg/mL 2 mL single dose vial 8 mcg   fentaNYL (Sublimaze) injection 50 mcg/mL 50 mcg   LR bolus Cannot be calculated   lidocaine (cardiac) injection 2% prefilled syringe 100 mg   midazolam (Versed) injection 1 mg/mL 2 mg   ondansetron (Zofran) 2 mg/mL injection 4 mg   phenylephrine 100 mcg/mL syringe 10 mL (prefilled) 100 mcg   propofol (Diprivan) injection 10 mg/mL 160 mg              Anesthesia Record               Intraprocedure I/O Totals       None           Specimen: No specimens collected     Drains and/or Catheters: none    Findings: routine circumcision    Indications: Trevor Rivera is an 24 y.o. male who is having surgery for Acquired phimosis of penis [N47.1].     The patient was seen in the preoperative area. The risks, benefits, complications, treatment options,  non-operative alternatives, expected recovery and outcomes were discussed with the patient. The possibilities of reaction to medication, pulmonary aspiration, injury to surrounding structures, bleeding, recurrent infection, the need for additional procedures, failure to diagnose a condition, and creating a complication requiring transfusion or operation were discussed with the patient. The patient concurred with the proposed plan, giving informed consent.  The site of surgery was properly noted/marked if necessary per policy. The patient has been actively warmed in preoperative area. Preoperative antibiotics have been ordered and given within 1 hours of incision. Ancef 2g. Venous thrombosis prophylaxis have been ordered including bilateral sequential compression devices    Procedure Details:   Patient transferred to the operative suite.  General anesthesia was induced.  Positioned supine and prepped and draped in the usual sterile fashion.  Operative pause performed.    Penile block performed with 10ml of 1:1 mix of 1% lidocaine : 0.5% bupivacaine.  Dorsal slit performed to allow retraction of foreskin.  Foreskin fully retracted and re-prepped as needed with betadine.  Distal and proximal circumcising incisions marked out and incised with 15 blade.  Dartos divided circumferentially with electrocautery.  Foreskin divided laterally and completely excised with no injury to the neurovascular bundle or urethra.  Ensured meticulous hemostasis with pinpoint application of electrocautery.  Skin re-approximated with 4-0 monocryl.  10ml of 1:1 local anesthetic mix instilled as a penile ring block.  Dressed with dermabond, followed by cling wrap and coban.    This concluded the procedure which the patient tolerated well.  Patient was cleaned, dried and awakened from anesthesia and transferred to PACU in excellent condition.    Plan:  Discharge home when meeting criteria  Remove dressing in 24 hours or if needed to  urinate  Follow up in 2 weeks for wound check     Complications:  None; patient tolerated the procedure well.    Disposition: PACU - hemodynamically stable.  Condition: stable         Task Performed by RNFA or Surgical Assistant:  none          Additional Details: none    Attending Attestation: I was present and scrubbed for the entire procedure.    James Barney  Phone Number: [phone]

## 2025-04-14 NOTE — ANESTHESIA PROCEDURE NOTES
Airway  Date/Time: 4/14/2025 8:16 AM  Urgency: elective    Airway not difficult    Staffing  Performed: SRNA   Authorized by: Christiano Montero MD    Performed by: Sergei Hylton  Patient location during procedure: OR    Indications and Patient Condition  Indications for airway management: anesthesia  Spontaneous Ventilation: absent  Sedation level: deep  Preoxygenated: yes  Patient position: sniffing  MILS maintained throughout  Mask difficulty assessment: 1 - vent by mask  Planned trial extubation    Final Airway Details  Final airway type: supraglottic airway      Successful airway: Supraglottic airway: iGel.  Size 4     Number of attempts at approach: 1

## 2025-04-14 NOTE — DISCHARGE INSTRUCTIONS
General discharge instructions:  It is normal to have some pain, slow onset swelling and bruising and small spotting of blood for 1-2 weeks after surgery  Return to ER if fevers, uncontrollable pain, inability to urinate or rapid swelling and bruising    No heavy lifting >10lbs x2 weeks - and if something hurts or makes bleeding worse then don't do it.  Penis is to be used only for peeing x2 weeks  Dressing is to be removed in 24 hours, or sooner if difficult to pee  No soaking in water x2 weeks, may shower right away      Rest for the first 24 hours to fully recover from anesthesia.  You may resume regular activities tomorrow as tolerated.    Pain management:  Take tylenol 1000mg every 8 hours and ibuprofen 600mg every 8 hours for pain control - alternate so you can take something every 4 hours    Follow up:  As scheduled in about 2 weeks for wound check    Dr. Barney's contact information:  - Nurse line (clinical questions):  [phone]  - Admin line (scheduling questions):  [phone]    James Barney MD

## 2025-04-14 NOTE — ANESTHESIA PREPROCEDURE EVALUATION
Patient: Trevor Rivera    Procedure Information       Date/Time: 04/14/25 0800    Procedure: CIRCUMCISION    Location: [facility] 03 / [facility]    Surgeons: James Barney MD            Relevant Problems   Anesthesia (within normal limits)      /Renal   (+) Bilateral hydronephrosis      Skin   (+) Eczema       Clinical information reviewed:   Tobacco  Allergies  Meds   Med Hx  Surg Hx   Fam Hx  Soc Hx        NPO Detail:  NPO/Void Status  Carbohydrate Drink Given Prior to Surgery? : N  Date of Last Liquid: 04/14/25  Time of Last Liquid: 0600  Date of Last Solid: 04/13/25  Time of Last Solid: 2000  Last Intake Type: Solid meal  Time of Last Void: 0656         Physical Exam    Airway  Mallampati: III  TM distance: >3 FB  Neck ROM: limited     Cardiovascular   Rhythm: regular  Rate: normal     Dental - normal exam     Pulmonary    Abdominal        Anesthesia Plan    History of general anesthesia?: yes  History of complications of general anesthesia?: no    ASA 3     general     The patient is not a current smoker.  Patient was not previously instructed to abstain from smoking on day of procedure.  Patient did not smoke on day of procedure.    intravenous induction   Anesthetic plan and risks discussed with patient.  Use of blood products discussed with patient who.    Plan discussed with CRNA.

## 2025-04-15 LAB
ATRIAL RATE: 81 BPM
P AXIS: 77 DEGREES
P OFFSET: 201 MS
P ONSET: 156 MS
PR INTERVAL: 126 MS
Q ONSET: 219 MS
QRS COUNT: 13 BEATS
QRS DURATION: 86 MS
QT INTERVAL: 358 MS
QTC CALCULATION(BAZETT): 415 MS
QTC FREDERICIA: 395 MS
R AXIS: 90 DEGREES
T AXIS: 21 DEGREES
T OFFSET: 398 MS
VENTRICULAR RATE: 81 BPM

## 2025-04-29 ENCOUNTER — APPOINTMENT (OUTPATIENT)
Facility: CLINIC | Age: 25
End: 2025-04-29
Payer: COMMERCIAL

## 2025-04-29 VITALS
SYSTOLIC BLOOD PRESSURE: 118 MMHG | TEMPERATURE: 98.9 F | BODY MASS INDEX: 20.66 KG/M2 | WEIGHT: 121 LBS | HEIGHT: 64 IN | HEART RATE: 77 BPM | DIASTOLIC BLOOD PRESSURE: 73 MMHG

## 2025-04-29 DIAGNOSIS — N13.30 BILATERAL HYDRONEPHROSIS: ICD-10-CM

## 2025-04-29 DIAGNOSIS — N47.1 ACQUIRED PHIMOSIS OF PENIS: Primary | ICD-10-CM

## 2025-04-29 PROCEDURE — 3008F BODY MASS INDEX DOCD: CPT | Performed by: STUDENT IN AN ORGANIZED HEALTH CARE EDUCATION/TRAINING PROGRAM

## 2025-04-29 PROCEDURE — 99213 OFFICE O/P EST LOW 20 MIN: CPT | Performed by: STUDENT IN AN ORGANIZED HEALTH CARE EDUCATION/TRAINING PROGRAM

## 2025-04-29 NOTE — PROGRESS NOTES
Chief complaint:  Post-op (2 week)  Referring physician:  No ref. provider found     SUBJECTIVE:  HPI:  Trevor Rivera is a 24 y.o. male with a history of  trisomy 21 (translocation), CKD2, Hashimoto thyroiditis, celiac disease, neurogenic bladder with bilateral hydronephrosis, phimosis who presents for follow up of circumcision post op check.  Here with his father.    No issues since surgery, voiding well.    Medical history:   has a past medical history of Acute upper respiratory infection, unspecified (07/17/2017), Celiac disease ([facility]), Chronic kidney disease, Enteroviral vesicular stomatitis with exanthem (12/13/2017), Hypothyroidism, unspecified (06/24/2013), Otitis media, unspecified, right ear (07/17/2017), Personal history of other diseases of the nervous system and sense organs (04/02/2015), Personal history of pneumonia (recurrent) (07/17/2017), and Rash and other nonspecific skin eruption (04/03/2015).   Surgical history:   has a past surgical history that includes Upper gastrointestinal endoscopy.  Family history:  family history includes Diabetes in his paternal grandmother.  Social history:   reports that he has never smoked. He has never used smokeless tobacco. He reports that he does not drink alcohol and does not use drugs.    Medications:    Current Outpatient Medications   Medication Instructions    levothyroxine (SYNTHROID, LEVOXYL) 56 mcg, oral, Daily      Allergies:    RX Allergies[1]     ROS:  14-point review of systems negative except as noted above.    OBJECTIVE:  Visit Vitals  /73   Pulse 77   Temp 37.2 °C (98.9 °F)   Body mass index is 20.77 kg/m².    Physical exam  General:  No acute distress  HEENT:  EOMI  CV:  Regular rate  Pulm:  Nonlabored respirations  Abd:  Soft, non-distended  :  Circumcision wound with 0.5cm area of skin separation at frenulum ventrally with healthy granulation tissue and minimal fibrinous exudate; otherwise c/d/i  MSK:  No contractures  Neuro:  Motor  "intact  Psych:  Appropriate affect    Labs:    Lab Results   Component Value Date    WBC 5.2 04/11/2025    HGB 16.7 04/11/2025    HCT 48.6 04/11/2025     04/11/2025    ALT 22 12/09/2021    AST 21 12/09/2021     04/11/2025    K 4.1 04/11/2025     04/11/2025    CREATININE 1.19 04/11/2025    BUN 18 04/11/2025    CO2 29 04/11/2025   No results found for: \"URINECULTURE\" No results found for: \"PSA\"    Imaging:  All imaging discussed in HPI was independently reviewed.    ASSESSMENT:  Phimosis  Neurogenic voiding dysfunction with bilateral hydronephrosis - high risk    Discussed skin separation, expect this will fully heal just will take a bit longer.  Counseled about signs of infection.    PLAN:  Monitor skin separation, vaseline prn, may shower, no soaking until skin fully healed over  Annual Cr and ROCKY - next 10/2025    Follow-up 11/2025    James Barney MD    Problem List Items Addressed This Visit       Bilateral hydronephrosis - Primary    Relevant Orders    US renal complete    Basic Metabolic Panel             [1] No Known Allergies    "

## 2025-05-05 ENCOUNTER — APPOINTMENT (OUTPATIENT)
Dept: PRIMARY CARE | Facility: CLINIC | Age: 25
End: 2025-05-05
Payer: COMMERCIAL

## 2025-07-24 ENCOUNTER — APPOINTMENT (OUTPATIENT)
Dept: PEDIATRIC ENDOCRINOLOGY | Facility: CLINIC | Age: 25
End: 2025-07-24
Payer: COMMERCIAL

## 2025-08-06 ENCOUNTER — TELEPHONE (OUTPATIENT)
Dept: PEDIATRIC ENDOCRINOLOGY | Facility: HOSPITAL | Age: 25
End: 2025-08-06
Payer: COMMERCIAL

## 2025-08-06 DIAGNOSIS — E06.3 HASHIMOTO'S THYROIDITIS: ICD-10-CM

## 2025-08-06 RX ORDER — LEVOTHYROXINE SODIUM 112 UG/1
56 TABLET ORAL DAILY
Qty: 15 TABLET | Refills: 11 | Status: SHIPPED | OUTPATIENT
Start: 2025-08-06

## 2025-08-07 ENCOUNTER — APPOINTMENT (OUTPATIENT)
Dept: PEDIATRIC ENDOCRINOLOGY | Facility: CLINIC | Age: 25
End: 2025-08-07
Payer: COMMERCIAL

## 2025-09-11 ENCOUNTER — APPOINTMENT (OUTPATIENT)
Dept: PEDIATRIC ENDOCRINOLOGY | Facility: CLINIC | Age: 25
End: 2025-09-11
Payer: COMMERCIAL

## 2025-10-10 ENCOUNTER — APPOINTMENT (OUTPATIENT)
Dept: PEDIATRIC NEPHROLOGY | Facility: CLINIC | Age: 25
End: 2025-10-10
Payer: COMMERCIAL

## 2025-11-18 ENCOUNTER — APPOINTMENT (OUTPATIENT)
Dept: UROLOGY | Facility: CLINIC | Age: 25
End: 2025-11-18
Payer: COMMERCIAL

## 2025-11-20 ENCOUNTER — APPOINTMENT (OUTPATIENT)
Dept: PEDIATRIC ENDOCRINOLOGY | Facility: CLINIC | Age: 25
End: 2025-11-20
Payer: COMMERCIAL

## (undated) DEVICE — Device

## (undated) DEVICE — SLEEVE, VASO PRESS, CALF GARMENT, MEDIUM, GREEN

## (undated) DEVICE — NEEDLE, ELECTRODE, ELECTROSURGICAL, INSULATED

## (undated) DEVICE — SPONGE, GAUZE, RADIOPAQUE, 12 PLY, 4 X 8 IN, STERILE, LF

## (undated) DEVICE — BANDAGE, STRETCH, CONFORM, 2 IN X 4.1 YD, STERILE

## (undated) DEVICE — BRIEF, STRETCH, XXXLARGE, MESH PANTS

## (undated) DEVICE — OINTMENT, TOPICAL, BACITRACIN